# Patient Record
Sex: MALE | Race: WHITE | NOT HISPANIC OR LATINO | Employment: UNEMPLOYED | URBAN - METROPOLITAN AREA
[De-identification: names, ages, dates, MRNs, and addresses within clinical notes are randomized per-mention and may not be internally consistent; named-entity substitution may affect disease eponyms.]

---

## 2017-02-04 ENCOUNTER — HOSPITAL ENCOUNTER (EMERGENCY)
Facility: HOSPITAL | Age: 57
Discharge: HOME | End: 2017-02-04
Admitting: EMERGENCY MEDICINE
Payer: COMMERCIAL

## 2017-02-04 VITALS
TEMPERATURE: 98.6 F | RESPIRATION RATE: 20 BRPM | OXYGEN SATURATION: 96 % | DIASTOLIC BLOOD PRESSURE: 77 MMHG | SYSTOLIC BLOOD PRESSURE: 145 MMHG | WEIGHT: 180 LBS | HEART RATE: 81 BPM

## 2017-02-04 DIAGNOSIS — S05.00XA CORNEAL ABRASION: Primary | ICD-10-CM

## 2017-02-04 PROCEDURE — 99282 EMERGENCY DEPT VISIT SF MDM: CPT

## 2017-02-04 RX ORDER — ERYTHROMYCIN 5 MG/G
0.5 OINTMENT OPHTHALMIC EVERY 12 HOURS
Qty: 3.5 G | Refills: 0 | Status: SHIPPED | OUTPATIENT
Start: 2017-02-04 | End: 2017-02-14

## 2017-02-04 RX ORDER — PROPARACAINE HYDROCHLORIDE 5 MG/ML
1 SOLUTION/ DROPS OPHTHALMIC ONCE
Status: COMPLETED | OUTPATIENT
Start: 2017-02-04 | End: 2017-02-04

## 2017-02-04 RX ADMIN — PROPARACAINE HYDROCHLORIDE 1 DROP: 5 SOLUTION/ DROPS OPHTHALMIC at 19:36

## 2017-02-04 RX ADMIN — FLUORESCEIN SODIUM 1 STRIP: 1 STRIP OPHTHALMIC at 19:36

## 2018-07-11 ENCOUNTER — HOSPITAL ENCOUNTER (EMERGENCY)
Facility: HOSPITAL | Age: 58
Discharge: HOME | End: 2018-07-11
Attending: EMERGENCY MEDICINE | Admitting: EMERGENCY MEDICINE
Payer: COMMERCIAL

## 2018-07-11 VITALS
DIASTOLIC BLOOD PRESSURE: 66 MMHG | OXYGEN SATURATION: 97 % | WEIGHT: 180 LBS | RESPIRATION RATE: 18 BRPM | TEMPERATURE: 98.3 F | HEART RATE: 61 BPM | SYSTOLIC BLOOD PRESSURE: 164 MMHG

## 2018-07-11 DIAGNOSIS — H10.9 CONJUNCTIVITIS: Primary | ICD-10-CM

## 2018-07-11 DIAGNOSIS — H01.003 BLEPHARITIS OF EYELID OF RIGHT EYE: ICD-10-CM

## 2018-07-11 PROCEDURE — 99282 EMERGENCY DEPT VISIT SF MDM: CPT

## 2018-07-11 RX ORDER — CEPHALEXIN 500 MG/1
500 CAPSULE ORAL EVERY 6 HOURS SCHEDULED
Qty: 28 CAPSULE | Refills: 0 | Status: SHIPPED | OUTPATIENT
Start: 2018-07-11 | End: 2018-07-18

## 2018-07-11 RX ADMIN — FLUORESCEIN SODIUM AND PROPARACAINE HYDROCHLORIDE 1 DROP: 2.5; 5 SOLUTION/ DROPS OPHTHALMIC at 17:41

## 2018-07-11 NOTE — DISCHARGE INSTRUCTIONS
Blepharitis   WHAT YOU NEED TO KNOW:   Blepharitis is inflammation of one or both eyelids  Your eyelid, eyelashes, oil glands, or whites of the eye may be affected  DISCHARGE INSTRUCTIONS:   Medicines:   · Medicines  can help decrease pain and swelling, or treat an infection  · Take your medicine as directed  Contact your healthcare provider if you think your medicine is not helping or if you have side effects  Tell him or her if you are allergic to any medicine  Keep a list of the medicines, vitamins, and herbs you take  Include the amounts, and when and why you take them  Bring the list or the pill bottles to follow-up visits  Carry your medicine list with you in case of an emergency  Follow up with your healthcare provider as directed:  Write down your questions so you remember to ask them during your visits  Care for your eyelid:  Always wash your hands with soap and water before and after eye care:  · Use artificial tears  twice a day if you have dry eye  · Apply a warm compress  for 10 minutes once a day to loosen crusts and to decrease itching and burning  · Gently scrub your upper and lower eyelid  with 2 to 3 drops of baby shampoo in ½ cup warm water 2 times a day  This will help open your clogged oil glands and remove pus or other material stuck to your eyelid  · Massage your upper and lower eyelid in small circles for 5 seconds  to loosen oil plugs and to decrease inflammation  · Do not wear contact lenses or eye makeup  until your eye has healed  Contact your healthcare provider if:   · Your vision changes  · Your signs and symptoms get worse, even after treatment  · Your signs and symptoms return  · You have a lump on your eyelid  · You have a pus-filled sore on your eyelid  · You have questions or concerns about your condition or care  Return to the emergency department if:   · You have severe pain  · You have vision loss    © 2017 [facility] Information is for End User's use only and may not be sold, redistributed or otherwise used for commercial purposes  All illustrations and images included in CareNotes® are the copyrighted property of [employer]  or David M[employer]  The above information is an  only  It is not intended as medical advice for individual conditions or treatments  Talk to your doctor, nurse or pharmacist before following any medical regimen to see if it is safe and effective for you

## 2018-07-11 NOTE — ED PROVIDER NOTES
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 Taking?   loperamide (IMODIUM) 2 mg capsule   No No   Sig: Take 1 capsule (2 mg total) by mouth 4 (four) times a day as needed for diarrhea  Facility-Administered Medications: None       Past Medical History:   Diagnosis Date    No known health problems        Past Surgical History:   Procedure Laterality Date    PLEURAL SCARIFICATION         History reviewed  No pertinent family history  I have reviewed and agree with the history as documented  Social History   Substance Use Topics    Smoking status: Former Smoker    Smokeless tobacco: Not on file      Comment: 25 years ago    Alcohol use Yes      Comment: social        Review of Systems   Eyes: Positive for pain, discharge, redness and itching  All other systems reviewed and are negative  Physical Exam  Physical Exam   Constitutional: He appears well-developed and well-nourished  No distress  HENT:   Head: Normocephalic and atraumatic  Eyes: EOM are normal  Pupils are equal, round, and reactive to light  R conjunctival injection with drainage noted  Slight upper lid with localized redness and edema  No pain with EOMI  Fluorescein stain without abrasions or foreign bodies  Cardiovascular: Normal rate  Pulmonary/Chest: No respiratory distress  Neurological: He is alert  Skin: Skin is warm and dry  No rash noted  He is not diaphoretic  Psychiatric: He has a normal mood and affect  Vital Signs  ED Triage Vitals [07/11/18 1657]   Temperature Pulse Respirations Blood Pressure SpO2   98 3 °F (36 8 °C) 61 18 164/66 97 %      Temp Source Heart Rate Source Patient Position - Orthostatic VS BP Location FiO2 (%)   Oral Monitor Sitting Left arm --      Pain Score       4           Vitals:    07/11/18 1657   BP: 164/66   Pulse: 61   Patient Position - Orthostatic VS: Sitting       Visual Acuity      ED Medications  Medications   Fluorescein-Proparacaine (FLUCAINE) 0 25-0 5 % ophthalmic solution 1 drop (1 drop Right Eye Given by Other 7/11/18 9561)       Diagnostic Studies  Results Reviewed     None                 No orders to display              Procedures  Procedures       Phone Contacts  ED Phone Contact    ED Course                               MDM  Number of Diagnoses or Management Options  Blepharitis of eyelid of right eye:   Conjunctivitis:   Diagnosis management comments: Blepharitis which has not improved with erythromycin ointment  Will treat with keflex  No signs or symptoms or periorbital cellulitis  Advised pt to follow-up with ophthalmology tomorrow  Return precautions discussed with pt and spouse  CritCare Time    Disposition  Final diagnoses:   Conjunctivitis   Blepharitis of eyelid of right eye     Time reflects when diagnosis was documented in both MDM as applicable and the Disposition within this note     Time User Action Codes Description Comment    7/11/2018  5:58 PM Anay Kilgore Add [H10 9] Conjunctivitis     7/11/2018  5:58 PM Anay Kilgore Add [H01 003] Blepharitis of eyelid of right eye       ED Disposition     ED Disposition Condition Comment    Discharge  Rojas Check discharge to home/self care      Condition at discharge: Good Follow-up Information     Follow up With Specialties Details [facility] [address] [facility]  Schedule an appointment as soon as possible for a visit  [address]  [phone]          Discharge Medication List as of 7/11/2018  5:59 PM      START taking these medications    Details   cephalexin (KEFLEX) 500 mg capsule Take 1 capsule (500 mg total) by mouth every 6 (six) hours for 7 days, Starting Wed 7/11/2018, Until Wed 7/18/2018, Print         CONTINUE these medications which have NOT CHANGED    Details   loperamide (IMODIUM) 2 mg capsule Take 1 capsule (2 mg total) by mouth 4 (four) times a day as needed for diarrhea , Starting 7/9/2016, Until Discontinued, Print           No discharge procedures on file      ED Provider  Electronically Signed by           Mark Mistry PA-C  07/11/18 5278

## 2020-06-15 ENCOUNTER — APPOINTMENT (EMERGENCY)
Dept: CT IMAGING | Facility: HOSPITAL | Age: 60
End: 2020-06-15
Payer: COMMERCIAL

## 2020-06-15 ENCOUNTER — HOSPITAL ENCOUNTER (EMERGENCY)
Facility: HOSPITAL | Age: 60
Discharge: HOME | End: 2020-06-15
Attending: EMERGENCY MEDICINE | Admitting: EMERGENCY MEDICINE
Payer: COMMERCIAL

## 2020-06-15 VITALS
HEART RATE: 103 BPM | SYSTOLIC BLOOD PRESSURE: 108 MMHG | RESPIRATION RATE: 20 BRPM | TEMPERATURE: 97.3 F | OXYGEN SATURATION: 96 % | DIASTOLIC BLOOD PRESSURE: 75 MMHG

## 2020-06-15 DIAGNOSIS — S71.131A: Primary | ICD-10-CM

## 2020-06-15 LAB
ANION GAP SERPL CALCULATED.3IONS-SCNC: 11 MMOL/L (ref 4–13)
APTT PPP: 34 SECONDS (ref 23–37)
BASOPHILS # BLD AUTO: 0.04 THOUSANDS/ΜL (ref 0–0.1)
BASOPHILS NFR BLD AUTO: 1 % (ref 0–1)
BUN SERPL-MCNC: 10 MG/DL (ref 5–25)
CALCIUM SERPL-MCNC: 8.3 MG/DL (ref 8.3–10.1)
CHLORIDE SERPL-SCNC: 101 MMOL/L (ref 100–108)
CO2 SERPL-SCNC: 25 MMOL/L (ref 21–32)
CREAT SERPL-MCNC: 0.9 MG/DL (ref 0.6–1.3)
EOSINOPHIL # BLD AUTO: 0.1 THOUSAND/ΜL (ref 0–0.61)
EOSINOPHIL NFR BLD AUTO: 1 % (ref 0–6)
ERYTHROCYTE [DISTWIDTH] IN BLOOD BY AUTOMATED COUNT: 12.7 % (ref 11.6–15.1)
GFR SERPL CREATININE-BSD FRML MDRD: 94 ML/MIN/1.73SQ M
GLUCOSE SERPL-MCNC: 122 MG/DL (ref 65–140)
HCT VFR BLD AUTO: 40 % (ref 36.5–49.3)
HGB BLD-MCNC: 12.9 G/DL (ref 12–17)
IMM GRANULOCYTES # BLD AUTO: 0.03 THOUSAND/UL (ref 0–0.2)
IMM GRANULOCYTES NFR BLD AUTO: 0 % (ref 0–2)
INR PPP: 1.04 (ref 0.84–1.19)
LYMPHOCYTES # BLD AUTO: 1.66 THOUSANDS/ΜL (ref 0.6–4.47)
LYMPHOCYTES NFR BLD AUTO: 24 % (ref 14–44)
MCH RBC QN AUTO: 33.1 PG (ref 26.8–34.3)
MCHC RBC AUTO-ENTMCNC: 32.3 G/DL (ref 31.4–37.4)
MCV RBC AUTO: 103 FL (ref 82–98)
MONOCYTES # BLD AUTO: 0.97 THOUSAND/ΜL (ref 0.17–1.22)
MONOCYTES NFR BLD AUTO: 14 % (ref 4–12)
NEUTROPHILS # BLD AUTO: 4.11 THOUSANDS/ΜL (ref 1.85–7.62)
NEUTS SEG NFR BLD AUTO: 60 % (ref 43–75)
NRBC BLD AUTO-RTO: 0 /100 WBCS
PLATELET # BLD AUTO: 217 THOUSANDS/UL (ref 149–390)
PMV BLD AUTO: 9.9 FL (ref 8.9–12.7)
POTASSIUM SERPL-SCNC: 3.9 MMOL/L (ref 3.5–5.3)
PROTHROMBIN TIME: 13.7 SECONDS (ref 11.6–14.5)
RBC # BLD AUTO: 3.9 MILLION/UL (ref 3.88–5.62)
SODIUM SERPL-SCNC: 137 MMOL/L (ref 136–145)
WBC # BLD AUTO: 6.91 THOUSAND/UL (ref 4.31–10.16)

## 2020-06-15 PROCEDURE — 90715 TDAP VACCINE 7 YRS/> IM: CPT | Performed by: EMERGENCY MEDICINE

## 2020-06-15 PROCEDURE — 99284 EMERGENCY DEPT VISIT MOD MDM: CPT

## 2020-06-15 PROCEDURE — 73701 CT LOWER EXTREMITY W/DYE: CPT

## 2020-06-15 PROCEDURE — 85025 COMPLETE CBC W/AUTO DIFF WBC: CPT | Performed by: EMERGENCY MEDICINE

## 2020-06-15 PROCEDURE — 99282 EMERGENCY DEPT VISIT SF MDM: CPT | Performed by: EMERGENCY MEDICINE

## 2020-06-15 PROCEDURE — 90471 IMMUNIZATION ADMIN: CPT

## 2020-06-15 PROCEDURE — 36415 COLL VENOUS BLD VENIPUNCTURE: CPT | Performed by: EMERGENCY MEDICINE

## 2020-06-15 PROCEDURE — 85610 PROTHROMBIN TIME: CPT | Performed by: EMERGENCY MEDICINE

## 2020-06-15 PROCEDURE — 80048 BASIC METABOLIC PNL TOTAL CA: CPT | Performed by: EMERGENCY MEDICINE

## 2020-06-15 PROCEDURE — 85730 THROMBOPLASTIN TIME PARTIAL: CPT | Performed by: EMERGENCY MEDICINE

## 2020-06-15 RX ADMIN — IOHEXOL 100 ML: 350 INJECTION, SOLUTION INTRAVENOUS at 21:38

## 2020-06-15 RX ADMIN — TETANUS TOXOID, REDUCED DIPHTHERIA TOXOID AND ACELLULAR PERTUSSIS VACCINE, ADSORBED 0.5 ML: 5; 2.5; 8; 8; 2.5 SUSPENSION INTRAMUSCULAR at 20:59

## 2022-10-05 ENCOUNTER — VBI (OUTPATIENT)
Dept: ADMINISTRATIVE | Facility: OTHER | Age: 62
End: 2022-10-05

## 2023-01-10 ENCOUNTER — VBI (OUTPATIENT)
Dept: ADMINISTRATIVE | Facility: OTHER | Age: 63
End: 2023-01-10

## 2023-10-26 ENCOUNTER — VBI (OUTPATIENT)
Dept: ADMINISTRATIVE | Facility: OTHER | Age: 63
End: 2023-10-26

## 2023-10-27 ENCOUNTER — HOSPITAL ENCOUNTER (EMERGENCY)
Facility: HOSPITAL | Age: 63
End: 2023-10-31
Attending: EMERGENCY MEDICINE
Payer: COMMERCIAL

## 2023-10-27 DIAGNOSIS — Z00.8 ENCOUNTER FOR PSYCHOLOGICAL EVALUATION: Primary | ICD-10-CM

## 2023-10-27 DIAGNOSIS — Z00.8 MEDICAL CLEARANCE FOR PSYCHIATRIC ADMISSION: ICD-10-CM

## 2023-10-27 DIAGNOSIS — K92.1 BLOODY STOOL: ICD-10-CM

## 2023-10-27 PROCEDURE — 99285 EMERGENCY DEPT VISIT HI MDM: CPT

## 2023-10-27 NOTE — LETTER
[address] [address]  Dept: [phone]      EMTALA TRANSFER CONSENT    NAME Arlene Vaughan                                         1960                              MRN [id number]    I have been informed of my rights regarding examination, treatment, and transfer   by Dr. Juan Allen MD    Benefits: Specialized equipment and/or services available at the receiving facility (Include comment)________________________    Risks: Potential for delay in receiving treatment      Transfer Request   I acknowledge that my medical condition has been evaluated and explained to me by the emergency department physician or other qualified medical person and/or my attending physician who has recommended and offered to me further medical examination and treatment. I understand the Hospital's obligation with respect to the treatment and stabilization of my emergency medical condition. I nevertheless request to be transferred. I release the Hospital, the doctor, and any other persons caring for me from all responsibility or liability for any injury or ill effects that may result from my transfer and agree to accept all responsibility for the consequences of my choice to transfer, rather than receive stabilizing treatment at the Hospital. I understand that because the transfer is my request, my insurance may not provide reimbursement for the services. The Hospital will assist and direct me and my family in how to make arrangements for transfer, but the hospital is not liable for any fees charged by the transport service. In spite of this understanding, I refuse to consent to further medical examination and treatment which has been offered to me, and request transfer to [address] [place] [address] [address] Name, [facility] : [id number].  I authorize the performance of emergency medical procedures and treatments upon me in both transit and upon arrival at the receiving facility. Additionally, I authorize the release of any and all medical records to the receiving facility and request they be transported with me, if possible. I authorize the performance of emergency medical procedures and treatments upon me in both transit and upon arrival at the receiving facility. Additionally, I authorize the release of any and all medical records to the receiving facility and request they be transported with me, if possible. I understand that the safest mode of transportation during a medical emergency is an ambulance and that the Hospital advocates the use of this mode of transport. Risks of traveling to the receiving facility by car, including absence of medical control, life sustaining equipment, such as oxygen, and medical personnel has been explained to me and I fully understand them. (KARI CORRECT BOX BELOW)  [  ]  I consent to the stated transfer and to be transported by ambulance/helicopter. [  ]  I consent to the stated transfer, but refuse transportation by ambulance and accept full responsibility for my transportation by car. I understand the risks of non-ambulance transfers and I exonerate the Hospital and its staff from any deterioration in my condition that results from this refusal.    X___________________________________________    DATE  10/31/23  TIME________  Signature of patient or legally responsible individual signing on patient behalf           RELATIONSHIP TO PATIENT_________________________          Provider Certification    NAME Cora Herman                                         1960                              MRN [id number]    A medical screening exam was performed on the above named patient. Based on the examination:    Condition Necessitating Transfer The primary encounter diagnosis was Encounter for psychological evaluation.  Diagnoses of Medical clearance for psychiatric admission and Bloody stool were also pertinent to this visit. Patient Condition: The patient has been stabilized such that within reasonable medical probability, no material deterioration of the patient condition or the condition of the unborn child(garo) is likely to result from the transfer    Reason for Transfer: Level of Care needed not available at this facility    Transfer Requirements: [address] available and qualified personnel available for treatment as acknowledged by Reji Fish MA  Agreed to accept transfer and to provide appropriate medical treatment as acknowledged by       Dr. Kevin Ahuja  Appropriate medical records of the examination and treatment of the patient are provided at the time of transfer   [address] _______  Transfer will be performed by qualified personnel from [facility] WITH [facility]  and appropriate transfer equipment as required, including the use of necessary and appropriate life support measures. Provider Certification: I have examined the patient and explained the following risks and benefits of being transferred/refusing transfer to the patient/family:  General risk, such as traffic hazards, adverse weather conditions, rough terrain or turbulence, possible failure of equipment (including vehicle or aircraft), or consequences of actions of persons outside the control of the transport personnel      Based on these reasonable risks and benefits to the patient and/or the unborn child(garo), and based upon the information available at the time of the patient’s examination, I certify that the medical benefits reasonably to be expected from the provision of appropriate medical treatments at another medical facility outweigh the increasing risks, if any, to the individual’s medical condition, and in the case of labor to the unborn child, from effecting the transfer.     X____________________________________________ DATE 10/31/23        TIME_______      ORIGINAL - SEND TO MEDICAL RECORDS   COPY - SEND WITH PATIENT DURING TRANSFER

## 2023-10-28 LAB
ALBUMIN SERPL BCP-MCNC: 4.5 G/DL (ref 3.5–5)
ALP SERPL-CCNC: 106 U/L (ref 34–104)
ALT SERPL W P-5'-P-CCNC: 64 U/L (ref 7–52)
AMPHETAMINES SERPL QL SCN: NEGATIVE
ANION GAP SERPL CALCULATED.3IONS-SCNC: 11 MMOL/L
APAP SERPL-MCNC: <10 UG/ML (ref 10–20)
AST SERPL W P-5'-P-CCNC: 93 U/L (ref 13–39)
BARBITURATES UR QL: NEGATIVE
BASOPHILS # BLD AUTO: 0.04 THOUSANDS/ÂΜL (ref 0–0.1)
BASOPHILS NFR BLD AUTO: 1 % (ref 0–1)
BENZODIAZ UR QL: NEGATIVE
BILIRUB SERPL-MCNC: 0.48 MG/DL (ref 0.2–1)
BILIRUB UR QL STRIP: NEGATIVE
BUN SERPL-MCNC: 7 MG/DL (ref 5–25)
CALCIUM SERPL-MCNC: 9.1 MG/DL (ref 8.4–10.2)
CHLORIDE SERPL-SCNC: 105 MMOL/L (ref 96–108)
CLARITY UR: CLEAR
CO2 SERPL-SCNC: 23 MMOL/L (ref 21–32)
COCAINE UR QL: NEGATIVE
COLOR UR: YELLOW
CREAT SERPL-MCNC: 0.86 MG/DL (ref 0.6–1.3)
EOSINOPHIL # BLD AUTO: 0.07 THOUSAND/ÂΜL (ref 0–0.61)
EOSINOPHIL NFR BLD AUTO: 1 % (ref 0–6)
ERYTHROCYTE [DISTWIDTH] IN BLOOD BY AUTOMATED COUNT: 12.7 % (ref 11.6–15.1)
ETHANOL EXG-MCNC: 0.05 MG/DL
ETHANOL EXG-MCNC: 0.16 MG/DL
ETHANOL SERPL-MCNC: 260 MG/DL
GFR SERPL CREATININE-BSD FRML MDRD: 93 ML/MIN/1.73SQ M
GLUCOSE SERPL-MCNC: 111 MG/DL (ref 65–140)
GLUCOSE SERPL-MCNC: 112 MG/DL (ref 65–140)
GLUCOSE UR STRIP-MCNC: NEGATIVE MG/DL
HCT VFR BLD AUTO: 41.5 % (ref 36.5–49.3)
HGB BLD-MCNC: 13.8 G/DL (ref 12–17)
HGB UR QL STRIP.AUTO: NEGATIVE
IMM GRANULOCYTES # BLD AUTO: 0.03 THOUSAND/UL (ref 0–0.2)
IMM GRANULOCYTES NFR BLD AUTO: 1 % (ref 0–2)
KETONES UR STRIP-MCNC: NEGATIVE MG/DL
LEUKOCYTE ESTERASE UR QL STRIP: NEGATIVE
LYMPHOCYTES # BLD AUTO: 2.26 THOUSANDS/ÂΜL (ref 0.6–4.47)
LYMPHOCYTES NFR BLD AUTO: 34 % (ref 14–44)
MCH RBC QN AUTO: 34.2 PG (ref 26.8–34.3)
MCHC RBC AUTO-ENTMCNC: 33.3 G/DL (ref 31.4–37.4)
MCV RBC AUTO: 103 FL (ref 82–98)
MONOCYTES # BLD AUTO: 0.81 THOUSAND/ÂΜL (ref 0.17–1.22)
MONOCYTES NFR BLD AUTO: 12 % (ref 4–12)
NEUTROPHILS # BLD AUTO: 3.4 THOUSANDS/ÂΜL (ref 1.85–7.62)
NEUTS SEG NFR BLD AUTO: 51 % (ref 43–75)
NITRITE UR QL STRIP: NEGATIVE
NRBC BLD AUTO-RTO: 0 /100 WBCS
OPIATES UR QL SCN: NEGATIVE
OXYCODONE+OXYMORPHONE UR QL SCN: NEGATIVE
PCP UR QL: NEGATIVE
PH UR STRIP.AUTO: 5.5 [PH]
PLATELET # BLD AUTO: 199 THOUSANDS/UL (ref 149–390)
PMV BLD AUTO: 9.6 FL (ref 8.9–12.7)
POTASSIUM SERPL-SCNC: 4.2 MMOL/L (ref 3.5–5.3)
PROT SERPL-MCNC: 7.8 G/DL (ref 6.4–8.4)
PROT UR STRIP-MCNC: NEGATIVE MG/DL
RBC # BLD AUTO: 4.04 MILLION/UL (ref 3.88–5.62)
SALICYLATES SERPL-MCNC: <5 MG/DL (ref 3–20)
SODIUM SERPL-SCNC: 139 MMOL/L (ref 135–147)
SP GR UR STRIP.AUTO: 1.01
THC UR QL: NEGATIVE
TSH SERPL DL<=0.05 MIU/L-ACNC: 2.5 UIU/ML (ref 0.45–4.5)
UROBILINOGEN UR QL STRIP.AUTO: 0.2 E.U./DL
WBC # BLD AUTO: 6.61 THOUSAND/UL (ref 4.31–10.16)

## 2023-10-28 PROCEDURE — 82948 REAGENT STRIP/BLOOD GLUCOSE: CPT

## 2023-10-28 PROCEDURE — 80179 DRUG ASSAY SALICYLATE: CPT | Performed by: EMERGENCY MEDICINE

## 2023-10-28 PROCEDURE — 84443 ASSAY THYROID STIM HORMONE: CPT | Performed by: EMERGENCY MEDICINE

## 2023-10-28 PROCEDURE — 80053 COMPREHEN METABOLIC PANEL: CPT | Performed by: EMERGENCY MEDICINE

## 2023-10-28 PROCEDURE — 81003 URINALYSIS AUTO W/O SCOPE: CPT | Performed by: EMERGENCY MEDICINE

## 2023-10-28 PROCEDURE — 96360 HYDRATION IV INFUSION INIT: CPT

## 2023-10-28 PROCEDURE — 96361 HYDRATE IV INFUSION ADD-ON: CPT

## 2023-10-28 PROCEDURE — 80307 DRUG TEST PRSMV CHEM ANLYZR: CPT | Performed by: EMERGENCY MEDICINE

## 2023-10-28 PROCEDURE — 80143 DRUG ASSAY ACETAMINOPHEN: CPT | Performed by: EMERGENCY MEDICINE

## 2023-10-28 PROCEDURE — 99285 EMERGENCY DEPT VISIT HI MDM: CPT | Performed by: EMERGENCY MEDICINE

## 2023-10-28 PROCEDURE — 36415 COLL VENOUS BLD VENIPUNCTURE: CPT | Performed by: EMERGENCY MEDICINE

## 2023-10-28 PROCEDURE — 85025 COMPLETE CBC W/AUTO DIFF WBC: CPT | Performed by: EMERGENCY MEDICINE

## 2023-10-28 PROCEDURE — 82077 ASSAY SPEC XCP UR&BREATH IA: CPT | Performed by: EMERGENCY MEDICINE

## 2023-10-28 PROCEDURE — 82075 ASSAY OF BREATH ETHANOL: CPT | Performed by: EMERGENCY MEDICINE

## 2023-10-28 RX ORDER — LANOLIN ALCOHOL/MO/W.PET/CERES
100 CREAM (GRAM) TOPICAL DAILY
Status: DISCONTINUED | OUTPATIENT
Start: 2023-10-28 | End: 2023-10-31 | Stop reason: HOSPADM

## 2023-10-28 RX ORDER — OLANZAPINE 10 MG/2ML
10 INJECTION, POWDER, FOR SOLUTION INTRAMUSCULAR ONCE
Status: DISCONTINUED | OUTPATIENT
Start: 2023-10-28 | End: 2023-10-28

## 2023-10-28 RX ORDER — LORAZEPAM 1 MG/1
2 TABLET ORAL ONCE
Status: COMPLETED | OUTPATIENT
Start: 2023-10-28 | End: 2023-10-28

## 2023-10-28 RX ORDER — LORAZEPAM 1 MG/1
1 TABLET ORAL ONCE
Status: COMPLETED | OUTPATIENT
Start: 2023-10-28 | End: 2023-10-28

## 2023-10-28 RX ORDER — LORAZEPAM 2 MG/ML
1 INJECTION INTRAMUSCULAR ONCE
Status: DISCONTINUED | OUTPATIENT
Start: 2023-10-28 | End: 2023-10-31

## 2023-10-28 RX ORDER — FOLIC ACID 1 MG/1
1 TABLET ORAL DAILY
Status: DISCONTINUED | OUTPATIENT
Start: 2023-10-28 | End: 2023-10-31 | Stop reason: HOSPADM

## 2023-10-28 RX ORDER — PANTOPRAZOLE SODIUM 40 MG/1
40 TABLET, DELAYED RELEASE ORAL
Status: DISCONTINUED | OUTPATIENT
Start: 2023-10-28 | End: 2023-10-31 | Stop reason: HOSPADM

## 2023-10-28 RX ORDER — OLANZAPINE 10 MG/1
10 TABLET ORAL ONCE
Status: COMPLETED | OUTPATIENT
Start: 2023-10-28 | End: 2023-10-28

## 2023-10-28 RX ADMIN — OLANZAPINE 10 MG: 10 TABLET, FILM COATED ORAL at 16:42

## 2023-10-28 RX ADMIN — SODIUM CHLORIDE 1000 ML: 0.9 INJECTION, SOLUTION INTRAVENOUS at 17:26

## 2023-10-28 RX ADMIN — SODIUM CHLORIDE 1000 ML: 0.9 INJECTION, SOLUTION INTRAVENOUS at 00:37

## 2023-10-28 RX ADMIN — Medication 1 TABLET: at 08:39

## 2023-10-28 RX ADMIN — THIAMINE HCL TAB 100 MG 100 MG: 100 TAB at 08:38

## 2023-10-28 RX ADMIN — LORAZEPAM 1 MG: 1 TABLET ORAL at 05:23

## 2023-10-28 RX ADMIN — SODIUM CHLORIDE 1000 ML: 0.9 INJECTION, SOLUTION INTRAVENOUS at 22:12

## 2023-10-28 RX ADMIN — LORAZEPAM 2 MG: 1 TABLET ORAL at 10:25

## 2023-10-28 RX ADMIN — FOLIC ACID 1 MG: 1 TABLET ORAL at 08:38

## 2023-10-28 RX ADMIN — LORAZEPAM 1 MG: 1 TABLET ORAL at 17:26

## 2023-10-28 NOTE — ED NOTES
See previous [facility] Suicide Risk Assessment. Re-screening not required unless change in behavior or suicidal ideation. Behavioral Health Assessment deferred as patient is sleeping and would benefit from additional rest.    Once patient is awake and able to participate, will complete assessments.      Alice Power RN  10/28/23 0075

## 2023-10-28 NOTE — ED PROVIDER NOTES
History  Chief Complaint   Patient presents with    Psychiatric Evaluation     302, pt also states he had bloody stool for a few months, daily alcohol 2L (wine) daily, anxiety attacks,stress. 63-year-old male presenting to the ED on a 302 warrant states that he has been having bloody stool for a few months and that he has been dealing with severe depression and anxiety for which she treats with alcohol. States that he drinks approximately 2 L of wine daily, does not drink hard liquor or beer. Denies hallucinations, other drug use. At this time patient is denying suicidal or homicidal ideation. Denies any physical pain however does state he has been having bloody stools for months. Patient denies any prior alcohol withdrawal or known seizures however does state that he has a family history of seizures but he has never had 1. Prior to Admission Medications   Prescriptions Last Dose Informant Patient Reported? Taking?   loperamide (IMODIUM) 2 mg capsule   No No   Sig: Take 1 capsule (2 mg total) by mouth 4 (four) times a day as needed for diarrhea. Facility-Administered Medications: None       Past Medical History:   Diagnosis Date    Anxiety     ETOH abuse     No known health problems        Past Surgical History:   Procedure Laterality Date    LUNG LOBECTOMY      PLEURAL SCARIFICATION         No family history on file. I have reviewed and agree with the history as documented. E-Cigarette/Vaping    E-Cigarette Use Never User      E-Cigarette/Vaping Substances    Nicotine No     Flavoring No      Social History     Tobacco Use    Smoking status: Former    Smokeless tobacco: Never    Tobacco comments:     25 years ago   Vaping Use    Vaping Use: Never used   Substance Use Topics    Alcohol use: Yes     Alcohol/week: 70.0 standard drinks of alcohol     Types: 70 Glasses of wine per week     Comment: social    Drug use: No       Review of Systems   Gastrointestinal:  Positive for blood in stool. Psychiatric/Behavioral:  Positive for suicidal ideas. All other systems reviewed and are negative. Physical Exam  Physical Exam  Vitals and nursing note reviewed. Exam conducted with a chaperone present Deannie Sandhoff, RN). Constitutional:       General: He is not in acute distress. Appearance: He is well-developed. He is not diaphoretic. HENT:      Head: Normocephalic and atraumatic. Right Ear: External ear normal.      Left Ear: External ear normal.      Nose: Nose normal.   Eyes:      General: No scleral icterus. Right eye: No discharge. Left eye: No discharge. Conjunctiva/sclera: Conjunctivae normal.   Cardiovascular:      Rate and Rhythm: Normal rate and regular rhythm. Heart sounds: Normal heart sounds. No murmur heard. No friction rub. No gallop. Pulmonary:      Effort: Pulmonary effort is normal. No respiratory distress. Breath sounds: Normal breath sounds. No wheezing or rales. Abdominal:      General: Bowel sounds are normal. There is no distension. Palpations: Abdomen is soft. There is no mass. Tenderness: There is no abdominal tenderness. There is no guarding. Genitourinary:     Rectum: Guaiac result negative. External hemorrhoid present. No mass, tenderness, anal fissure or internal hemorrhoid. Normal anal tone. Musculoskeletal:         General: No tenderness or deformity. Normal range of motion. Cervical back: Normal range of motion and neck supple. Skin:     General: Skin is warm and dry. Coloration: Skin is not pale. Findings: No erythema or rash. Neurological:      General: No focal deficit present. Mental Status: He is alert and oriented to person, place, and time. Psychiatric:         Behavior: Behavior normal.         Thought Content:  Thought content normal.         Judgment: Judgment normal.               Vital Signs  ED Triage Vitals [10/28/23 0000]   Temp Pulse Respirations Blood Pressure SpO2 -- 86 16 159/98 95 %      Temp src Heart Rate Source Patient Position - Orthostatic VS BP Location FiO2 (%)   -- Monitor Lying Left arm --      Pain Score       No Pain           Vitals:    10/28/23 0000 10/28/23 0400 10/28/23 0530   BP: 159/98 108/55 145/91   Pulse: 86 80 83   Patient Position - Orthostatic VS: Lying           Visual Acuity      ED Medications  Medications   thiamine tablet 100 mg (has no administration in time range)   folic acid (FOLVITE) tablet 1 mg (has no administration in time range)   multivitamin-minerals (CENTRUM) tablet 1 tablet (has no administration in time range)   pantoprazole (PROTONIX) EC tablet 40 mg (40 mg Oral Not Given 10/28/23 0525)   sodium chloride 0.9 % bolus 1,000 mL (0 mL Intravenous Stopped 10/28/23 0419)   LORazepam (ATIVAN) tablet 1 mg (1 mg Oral Given 10/28/23 0523)       Diagnostic Studies  Results Reviewed       Procedure Component Value Units Date/Time    Fingerstick Glucose (POCT) [[id number]]  (Normal) Collected: 10/28/23 0610    Lab Status: Final result Updated: 10/28/23 0611     POC Glucose 111 mg/dl     TSH [[id number]]  (Normal) Collected: 10/28/23 0033    Lab Status: Final result Specimen: Blood from Arm, Right Updated: 10/28/23 0125     TSH 3RD GENERATON 2.505 uIU/mL     Ethanol [[id number]]  (Abnormal) Collected: 10/28/23 0033    Lab Status: Final result Specimen: Blood from Arm, Right Updated: 10/28/23 0109     Ethanol Lvl 260 mg/dL     Comprehensive metabolic panel [[id number]]  (Abnormal) Collected: 10/28/23 0033    Lab Status: Final result Specimen: Blood from Arm, Right Updated: 10/28/23 0109     Sodium 139 mmol/L      Potassium 4.2 mmol/L      Chloride 105 mmol/L      CO2 23 mmol/L      ANION GAP 11 mmol/L      BUN 7 mg/dL      Creatinine 0.86 mg/dL      Glucose 112 mg/dL      Calcium 9.1 mg/dL      AST 93 U/L      ALT 64 U/L      Alkaline Phosphatase 106 U/L      Total Protein 7.8 g/dL      Albumin 4.5 g/dL      Total Bilirubin 0.48 mg/dL      eGFR 93 ml/min/1.73sq m     Narrative:      [facility] guidelines for Chronic Kidney Disease (CKD):     Stage 1 with normal or high GFR (GFR > 90 mL/min/1.73 square meters)    Stage 2 Mild CKD (GFR = 60-89 mL/min/1.73 square meters)    Stage 3A Moderate CKD (GFR = 45-59 mL/min/1.73 square meters)    Stage 3B Moderate CKD (GFR = 30-44 mL/min/1.73 square meters)    Stage 4 Severe CKD (GFR = 15-29 mL/min/1.73 square meters)    Stage 5 End Stage CKD (GFR <15 mL/min/1.73 square meters)  Note: GFR calculation is accurate only with a steady state creatinine    Salicylate level [[id number]]  (Normal) Collected: 10/28/23 0033    Lab Status: Final result Specimen: Blood from Arm, Right Updated: 47/10/98 5772     Salicylate Lvl <5 mg/dL     Acetaminophen level-If concentration is detectable, please discuss with medical  on call. [[id number]]  (Abnormal) Collected: 10/28/23 0033    Lab Status: Final result Specimen: Blood from Arm, Right Updated: 10/28/23 0109     Acetaminophen Level <10 ug/mL     Rapid drug screen, urine [[id number]] Collected: 10/28/23 0041    Lab Status: Final result Specimen: Urine Updated: 10/28/23 0058     Amph/Meth UR Negative     Barbiturate Ur Negative     Benzodiazepine Urine Negative     Cocaine Urine Negative     Methadone Urine --     Opiate Urine Negative     PCP Ur Negative     THC Urine Negative     Oxycodone Urine Negative    Narrative:      FOR MEDICAL PURPOSES ONLY. IF CONFIRMATION NEEDED PLEASE CONTACT THE LAB WITHIN 5 DAYS.     Drug Screen Cutoff Levels:  AMPHETAMINE/METHAMPHETAMINES  1000 ng/mL  BARBITURATES     200 ng/mL  BENZODIAZEPINES     200 ng/mL  COCAINE      300 ng/mL  METHADONE      300 ng/mL  OPIATES      300 ng/mL  PHENCYCLIDINE     25 ng/mL  THC       50 ng/mL  OXYCODONE      100 ng/mL    UA w Reflex to Microscopic w Reflex to Culture [[id number]]  (Normal) Collected: 10/28/23 0035    Lab Status: Final result Specimen: Urine, Other Updated: 10/28/23 0044     Color, UA Yellow     Clarity, UA Clear     Specific Gravity, UA 1.010     pH, UA 5.5     Leukocytes, UA Negative     Nitrite, UA Negative     Protein, UA Negative mg/dl      Glucose, UA Negative mg/dl      Ketones, UA Negative mg/dl      Urobilinogen, UA 0.2 E.U./dl      Bilirubin, UA Negative     Occult Blood, UA Negative    CBC and differential [[id number]]  (Abnormal) Collected: 10/28/23 0033    Lab Status: Final result Specimen: Blood from Arm, Right Updated: 10/28/23 0042     WBC 6.61 Thousand/uL      RBC 4.04 Million/uL      Hemoglobin 13.8 g/dL      Hematocrit 41.5 %       fL      MCH 34.2 pg      MCHC 33.3 g/dL      RDW 12.7 %      MPV 9.6 fL      Platelets 369 Thousands/uL      nRBC 0 /100 WBCs      Neutrophils Relative 51 %      Immat GRANS % 1 %      Lymphocytes Relative 34 %      Monocytes Relative 12 %      Eosinophils Relative 1 %      Basophils Relative 1 %      Neutrophils Absolute 3.40 Thousands/µL      Immature Grans Absolute 0.03 Thousand/uL      Lymphocytes Absolute 2.26 Thousands/µL      Monocytes Absolute 0.81 Thousand/µL      Eosinophils Absolute 0.07 Thousand/µL      Basophils Absolute 0.04 Thousands/µL     POCT alcohol breath test [[id number]]  (Normal) Resulted: 10/28/23 0040    Lab Status: Final result Updated: 10/28/23 0040     EXTBreath Alcohol 0.162                   No orders to display              Procedures  ECG 12 Lead Documentation Only    Date/Time: 10/28/2023 12:36 AM    Performed by: Ellie Suresh DO  Authorized by: Ellie Suresh DO    Patient location:  ED  Rate:     ECG rate:  83    ECG rate assessment: normal    Rhythm:     Rhythm: sinus rhythm    Ectopy:     Ectopy: none    QRS:     QRS axis:  Normal    QRS intervals:  Normal  Conduction:     Conduction: normal    ST segments:     ST segments:  Normal  T waves:     T waves: normal             ED Course  ED Course as of 10/28/23 0629   Sat Oct 28, 2023   0048 Patient allowing us to speak with his wife who is present. Patient's wife adding corroborating evidence and providing text messages that were reportedly sent to his sister as his reported last will and testament   0116 MEDICAL ALCOHOL(!): 260   0117 Hemoglobin: 13.8                               SBIRT 20yo+      Flowsheet Row Most Recent Value   Initial Alcohol Screen: US AUDIT-C     1. How often do you have a drink containing alcohol? 6 Filed at: 10/28/2023 0009   2. How many drinks containing alcohol do you have on a typical day you are drinking? 6 Filed at: 10/28/2023 0009   3a. Male UNDER 65: How often do you have five or more drinks on one occasion? 6 Filed at: 10/28/2023 0009   3b. FEMALE Any Age, or MALE 65+: How often do you have 4 or more drinks on one occassion? 0 Filed at: 10/28/2023 0009   Audit-C Score 18 Filed at: 10/28/2023 0009   Full Alcohol Screen: US AUDIT    4. How often during the last year have you found that you were not able to stop drinking once you had started? 4 Filed at: 10/28/2023 0009   5. How often during past year have you failed to do what was normally expected of you because of drinking? 0 Filed at: 10/28/2023 0009   6. How often in past year have you needed a first drink in the morning to get yourself going after a heavy drinking session? 4 Filed at: 10/28/2023 0009   7. How often in past year have you had feeling of guilt or remorse after drinking? 4 Filed at: 10/28/2023 0009   8. How often in past year have you been unable to remember what happened night before because you had been drinking? 4 Filed at: 10/28/2023 0009   9. Have you or someone else been injured as a result of your drinking? 4 Filed at: 10/28/2023 0009   10. Has a relative, friend, doctor or other health worker been concerned about your drinking and suggested you cut down? 4 Filed at: 10/28/2023 0009   AUDIT Total Score 42 Filed at: 10/28/2023 0009   CAREN: How many times in the past year have you. ..     Used an illegal drug or used a prescription medication for non-medical reasons? Never Filed at: 10/28/2023 0009                      Medical Decision Making  62-year-old male with history of alcohol use with reported anxiety and depression with suicidal statements made to family who filed a 36. Patient does admit to worsening depression and anxiety however at this time while intoxicated denies suicidal and homicidal ideation he does admit to rectal bleeding for the last few months. Does state he wants help with stopping drinking. Blood work along with crisis evaluation when sober. Patient is on a 302 warrant so is unable to leave at this time as he is clearly intoxicated. Patient is calm and cooperative on initial evaluation and willing to stay as he does state he wants help. Signed out to Dr. Bart Rubio pending crisis evaluation    Amount and/or Complexity of Data Reviewed  Labs: ordered. Decision-making details documented in ED Course. Risk  OTC drugs. Prescription drug management. Decision regarding hospitalization. Disposition  Final diagnoses:   Encounter for psychological evaluation     Time reflects when diagnosis was documented in both MDM as applicable and the Disposition within this note       Time User Action Codes Description Comment    10/28/2023  1:44 AM Gerardo Hill Add [Z00.8] Encounter for psychological evaluation           ED Disposition       ED Disposition   Transfer to [address] [place] [address]   --    Date/Time   Sat Oct 28, 2023 0144    Comment   Maritza Piper should be transferred out to [facility] and has been medically cleared. Follow-up Information    None         Patient's Medications   Discharge Prescriptions    No medications on file       No discharge procedures on file.     PDMP Review       None            ED Provider  Electronically Signed by             Juliana Luna DO  10/28/23 4976
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
## 2023-10-28 NOTE — ED NOTES
Pt ambulated with aid around the ED, Pt is calm and eating Dinner now with wife at bedside. Denies pain or any need at this time.       Bo Harris  10/28/23 1538

## 2023-10-28 NOTE — ED NOTES
CIS faxed 201 and facesheet to intake. CIS sent TT to intake to inform of fax. Patient prefers [facility] at this time but with alcohol abuse not sure if that would be the best option.

## 2023-10-28 NOTE — ED NOTES
302 was petitioned by the patient's wife, Travon Kwok. 302 states:  "My  has been diagnosed with depression in the past after numerous suicidal threats. He is not currently involved in any [address] [place] [address] services. 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 During that visit he repeatedly made statements about driving into a tractor trailer as well as driving erratically at high rates of speed. 4-5 days ago he called me to tell me he was again feeling depressed and was going to kill himself. He then hung up the phone and would not answer my calls. I contacted the state troopers. They went to the home but he told them he was fine. Earlier today he began sending family members his "will statement." He provided instructions on who to give his belongings to because hew as going to kill himself tonight. We again contacted the state police but they have been unable to make contact. The last think he said before hanging up was that he was going to run his car into a tractor trailer because he had nothing left to live for. It is my concern that without intervention he will follow through with these threats.

## 2023-10-28 NOTE — ED NOTES
Insurance Authorization for admission:   Phone call placed to [employer] . Phone number: [phone]. Spoke to [facility]. 5 days approved. Level of care: 201 inpatient psychiatric .   Review on and Authorization # - pending placement    ID [id number]

## 2023-10-28 NOTE — ED NOTES
201 prepared. 201, rights and 72 hour notice reviewed with patient. Patient's wife at bedside. Patient in agreement and signed. Patient reported increased anxiety and is waiting to get his Ativan.

## 2023-10-28 NOTE — ED NOTES
See previous [facility] Suicide Risk Assessment. Re-screening not required unless change in behavior or suicidal ideation. Behavioral Health Assessment deferred as patient is sleeping and would benefit from additional rest.  Vital signs deferred until patient awake, no signs or symptoms of respiratory distress at this time. Once patient is awake and able to participate, will complete assessments.     1:1 remains at bedside     Sebastian Heart RN  10/28/23 8585

## 2023-10-28 NOTE — ED NOTES
Patient reports blood in stool. Patient wife showed this RN photo from stool 2 days prior that was bright red blood. Patient has hx of hemorrhoids and was checked by initial provider for same. Patient reporting continued blood in stool with mild lightheadedness. After last BM, this RN visualized toilet and minimal blood present. Provider notified.      Ezequiel Henderson RN  10/28/23 9788

## 2023-10-29 RX ORDER — LORAZEPAM 1 MG/1
1 TABLET ORAL ONCE
Status: COMPLETED | OUTPATIENT
Start: 2023-10-29 | End: 2023-10-29

## 2023-10-29 RX ADMIN — Medication 1 TABLET: at 09:23

## 2023-10-29 RX ADMIN — THIAMINE HCL TAB 100 MG 100 MG: 100 TAB at 09:23

## 2023-10-29 RX ADMIN — FOLIC ACID 1 MG: 1 TABLET ORAL at 09:23

## 2023-10-29 RX ADMIN — LORAZEPAM 1 MG: 1 TABLET ORAL at 23:11

## 2023-10-29 RX ADMIN — LORAZEPAM 1 MG: 1 TABLET ORAL at 01:22

## 2023-10-29 NOTE — ED NOTES
Patient is up and eating breakfast. Patient has no complaints at this time      Exline Cuff  10/29/23 6372

## 2023-10-29 NOTE — ED CARE HANDOFF
Emergency Department Sign Out Note        Sign out and transfer of care from Dr. Sheyla Oliveros. See Separate Emergency Department note. The patient, Pete Mcnulty, was evaluated by the previous provider for alcohol intoxication as well as depression with suicidal ideation. The patient apparently wrote a last well and testimony to his family. Patient currently is a 201 but would need to be converted to a 302 if he would like to leave due to the fact that he is high risk. .    Workup Completed:  Patient had an emergency department work-up prior to my evaluation which consisted of a TSH, rapid drug screen urinalysis alcohol, CBC, and CMP. Patient was initially found to have an alcohol of 260 however repeat breath test shows an alcohol of 46. ED Course / Workup Pending (followup): Patient was medically cleared prior to my arrival and is currently awaiting bed placement. ED Course as of 10/29/23 2237   Onesimo Maciel Oct 29, 2023   0711 Received in signout, patient drinks 2 L of wine daily, and apparently wrote a goodbye letter to his family. Patient is a 201 at present but would require 302 if he decides to leave. The patient has been medically cleared and is awaiting bed placement   2236 Case signed out to Dr. Tacho Conner pending bed placement     Procedures  Medical Decision Making  Amount and/or Complexity of Data Reviewed  Labs: ordered. Risk  OTC drugs. Prescription drug management. Decision regarding hospitalization.             Disposition  Final diagnoses:   Encounter for psychological evaluation     Time reflects when diagnosis was documented in both MDM as applicable and the Disposition within this note       Time User Action Codes Description Comment    10/28/2023  1:44 AM Melody Arroyo Add [Z00.8] Encounter for psychological evaluation           ED Disposition       ED Disposition   Transfer to [address] [place] [address]   --    Date/Time   Sat Oct 28, 2023  1:44 AM Comment   Romayne Arlington should be transferred out to [facility] and has been medically cleared. MD Documentation      [facility] Most Recent Value   Sending MD Dr. José Miguel Carreon    None       Patient's Medications   Discharge Prescriptions    No medications on file     No discharge procedures on file.        ED Provider  Electronically Signed by     Fahad Osullivan., DO  10/29/23 3998

## 2023-10-29 NOTE — ED NOTES
IV fluids not flowing. IV checked and catheter bent. Provider notified. IV removed and new IV placed per providers order.      Melissa Luevano  10/28/23 2032
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
## 2023-10-29 NOTE — ED NOTES
See previous [facility] Suicide Risk Assessment. Re-screening not required unless change in behavior or suicidal ideation. Behavioral Health Assessment deferred as patient is sleeping and would benefit from additional rest.  Vital signs deferred until patient awake, no signs or symptoms of respiratory distress at this time. Once patient is awake and able to participate, will complete assessments.        Bony Mccarthy RN  10/29/23 1112

## 2023-10-30 RX ORDER — LORAZEPAM 1 MG/1
2 TABLET ORAL ONCE
Status: COMPLETED | OUTPATIENT
Start: 2023-10-30 | End: 2023-10-30

## 2023-10-30 RX ORDER — LORAZEPAM 1 MG/1
0.5 TABLET ORAL ONCE
Status: COMPLETED | OUTPATIENT
Start: 2023-10-30 | End: 2023-10-30

## 2023-10-30 RX ORDER — ACETAMINOPHEN 325 MG/1
650 TABLET ORAL ONCE
Status: COMPLETED | OUTPATIENT
Start: 2023-10-30 | End: 2023-10-30

## 2023-10-30 RX ADMIN — LORAZEPAM 2 MG: 1 TABLET ORAL at 10:14

## 2023-10-30 RX ADMIN — FOLIC ACID 1 MG: 1 TABLET ORAL at 10:01

## 2023-10-30 RX ADMIN — ACETAMINOPHEN 650 MG: 325 TABLET ORAL at 19:30

## 2023-10-30 RX ADMIN — LORAZEPAM 0.5 MG: 1 TABLET ORAL at 19:30

## 2023-10-30 RX ADMIN — THIAMINE HCL TAB 100 MG 100 MG: 100 TAB at 10:01

## 2023-10-30 RX ADMIN — Medication 1 TABLET: at 10:01

## 2023-10-30 NOTE — ED NOTES
Patient aware that there are no beds available at [facility] [place]. Patient however continues to decline a bed search and is wanting to wait for a bed to open at [facility] [place].

## 2023-10-30 NOTE — ED NOTES
See previous [facility] Suicide Risk Assessment. Re-screening not required unless change in behavior or suicidal ideation. Behavioral Health Assessment deferred as patient is sleeping and would benefit from additional rest.  Vital signs deferred until patient awake, no signs or symptoms of respiratory distress at this time. Once patient is awake and able to participate, will complete assessments.     1:1 remains at bedside     Jean Caputo RN  10/30/23 2957

## 2023-10-30 NOTE — ED CARE HANDOFF
Emergency Department Sign Out Note        Sign out and transfer of care from Dr Shanda Lopez. See Separate Emergency Department note. The patient, Martínez Patino, was evaluated by the previous provider for SI. Workup Completed:  Psych work up    ED Course / Workup Pending (followup):                                      ED Course as of 10/30/23 0028   Mon Oct 30, 2023   0026 Received in sign out:     Pt was initially 302  Signed 201 for SI/depression  Pt should not be able to leave due to SI    Medically cleared:   Cbc, cmp, coma panel, ua  uds, ETOH done    No issues during last shift    Bed search in progress       Procedures  Medical Decision Making  Amount and/or Complexity of Data Reviewed  Labs: ordered. Risk  OTC drugs. Prescription drug management. Decision regarding hospitalization. Disposition  Final diagnoses:   Encounter for psychological evaluation     Time reflects when diagnosis was documented in both MDM as applicable and the Disposition within this note       Time User Action Codes Description Comment    10/28/2023  1:44 AM Bj Cha Add [Z00.8] Encounter for psychological evaluation           ED Disposition       ED Disposition   Transfer to [address] [place] [address]   --    Date/Time   Sat Oct 28, 2023  1:44 AM    Comment   Martínez Patino should be transferred out to [facility] and has been medically cleared. MD Documentation      Shanon Day Most Recent Value   Sending MD Dr. Esha Huber    None       Patient's Medications   Discharge Prescriptions    No medications on file     No discharge procedures on file.        ED Provider  Electronically Signed by     Hua Laws MD  10/30/23 5677

## 2023-10-30 NOTE — ED NOTES
Confirmed w/Erik, HERBERT, that referral is with intake. Per Rachele Sales, there are no older adult beds in-network at this time.

## 2023-10-31 ENCOUNTER — HOSPITAL ENCOUNTER (INPATIENT)
Facility: HOSPITAL | Age: 63
LOS: 7 days | Discharge: HOME | DRG: 751 | End: 2023-11-07
Attending: PSYCHIATRY & NEUROLOGY | Admitting: PSYCHIATRY & NEUROLOGY
Payer: COMMERCIAL

## 2023-10-31 VITALS
DIASTOLIC BLOOD PRESSURE: 72 MMHG | RESPIRATION RATE: 18 BRPM | TEMPERATURE: 98.6 F | HEIGHT: 68 IN | SYSTOLIC BLOOD PRESSURE: 142 MMHG | OXYGEN SATURATION: 98 % | HEART RATE: 87 BPM | WEIGHT: 230.6 LBS | BODY MASS INDEX: 34.95 KG/M2

## 2023-10-31 DIAGNOSIS — F33.2 MDD (MAJOR DEPRESSIVE DISORDER), RECURRENT EPISODE, SEVERE (HCC): Primary | ICD-10-CM

## 2023-10-31 DIAGNOSIS — K21.9 GERD (GASTROESOPHAGEAL REFLUX DISEASE): ICD-10-CM

## 2023-10-31 DIAGNOSIS — Z72.0 TOBACCO ABUSE: ICD-10-CM

## 2023-10-31 DIAGNOSIS — F10.10 ALCOHOL ABUSE: ICD-10-CM

## 2023-10-31 DIAGNOSIS — K92.1 BLOODY STOOL: ICD-10-CM

## 2023-10-31 DIAGNOSIS — E55.9 VITAMIN D DEFICIENCY: ICD-10-CM

## 2023-10-31 DIAGNOSIS — M19.90 DJD (DEGENERATIVE JOINT DISEASE): ICD-10-CM

## 2023-10-31 DIAGNOSIS — Z00.8 MEDICAL CLEARANCE FOR PSYCHIATRIC ADMISSION: ICD-10-CM

## 2023-10-31 DIAGNOSIS — K64.9 HEMORRHOIDS: ICD-10-CM

## 2023-10-31 RX ORDER — OLANZAPINE 10 MG/2ML
5 INJECTION, POWDER, FOR SOLUTION INTRAMUSCULAR
Status: DISCONTINUED | OUTPATIENT
Start: 2023-10-31 | End: 2023-11-07 | Stop reason: HOSPADM

## 2023-10-31 RX ORDER — POLYETHYLENE GLYCOL 3350 17 G/17G
17 POWDER, FOR SOLUTION ORAL DAILY PRN
Status: DISCONTINUED | OUTPATIENT
Start: 2023-10-31 | End: 2023-11-01

## 2023-10-31 RX ORDER — OLANZAPINE 5 MG/1
5 TABLET ORAL
Status: DISCONTINUED | OUTPATIENT
Start: 2023-10-31 | End: 2023-11-07 | Stop reason: HOSPADM

## 2023-10-31 RX ORDER — FOLIC ACID 1 MG/1
1 TABLET ORAL DAILY
Status: CANCELLED | OUTPATIENT
Start: 2023-11-01

## 2023-10-31 RX ORDER — MAGNESIUM HYDROXIDE/ALUMINUM HYDROXICE/SIMETHICONE 120; 1200; 1200 MG/30ML; MG/30ML; MG/30ML
30 SUSPENSION ORAL EVERY 4 HOURS PRN
Status: CANCELLED | OUTPATIENT
Start: 2023-10-31

## 2023-10-31 RX ORDER — LORAZEPAM 1 MG/1
1 TABLET ORAL ONCE
Status: COMPLETED | OUTPATIENT
Start: 2023-10-31 | End: 2023-10-31

## 2023-10-31 RX ORDER — OLANZAPINE 2.5 MG/1
2.5 TABLET ORAL
Status: DISCONTINUED | OUTPATIENT
Start: 2023-10-31 | End: 2023-11-07 | Stop reason: HOSPADM

## 2023-10-31 RX ORDER — BISACODYL 10 MG
10 SUPPOSITORY, RECTAL RECTAL DAILY PRN
Status: DISCONTINUED | OUTPATIENT
Start: 2023-10-31 | End: 2023-11-01

## 2023-10-31 RX ORDER — LANOLIN ALCOHOL/MO/W.PET/CERES
100 CREAM (GRAM) TOPICAL DAILY
Status: CANCELLED | OUTPATIENT
Start: 2023-11-01

## 2023-10-31 RX ORDER — TRAZODONE HYDROCHLORIDE 50 MG/1
50 TABLET ORAL
Status: DISCONTINUED | OUTPATIENT
Start: 2023-10-31 | End: 2023-11-07 | Stop reason: HOSPADM

## 2023-10-31 RX ORDER — PROPRANOLOL HYDROCHLORIDE 10 MG/1
5 TABLET ORAL EVERY 8 HOURS PRN
Status: CANCELLED | OUTPATIENT
Start: 2023-10-31

## 2023-10-31 RX ORDER — PROPRANOLOL HYDROCHLORIDE 10 MG/1
5 TABLET ORAL EVERY 8 HOURS PRN
Status: DISCONTINUED | OUTPATIENT
Start: 2023-10-31 | End: 2023-11-07 | Stop reason: HOSPADM

## 2023-10-31 RX ORDER — IBUPROFEN 400 MG/1
400 TABLET ORAL ONCE
Status: COMPLETED | OUTPATIENT
Start: 2023-10-31 | End: 2023-10-31

## 2023-10-31 RX ORDER — ACETAMINOPHEN 325 MG/1
650 TABLET ORAL ONCE
Status: COMPLETED | OUTPATIENT
Start: 2023-10-31 | End: 2023-10-31

## 2023-10-31 RX ORDER — OLANZAPINE 2.5 MG/1
5 TABLET ORAL
Status: CANCELLED | OUTPATIENT
Start: 2023-10-31

## 2023-10-31 RX ORDER — LORAZEPAM 2 MG/ML
1 INJECTION INTRAMUSCULAR
Status: CANCELLED | OUTPATIENT
Start: 2023-10-31

## 2023-10-31 RX ORDER — BENZTROPINE MESYLATE 1 MG/ML
1 INJECTION INTRAMUSCULAR; INTRAVENOUS
Status: DISCONTINUED | OUTPATIENT
Start: 2023-10-31 | End: 2023-11-07 | Stop reason: HOSPADM

## 2023-10-31 RX ORDER — POLYETHYLENE GLYCOL 3350 17 G/17G
17 POWDER, FOR SOLUTION ORAL DAILY PRN
Status: CANCELLED | OUTPATIENT
Start: 2023-10-31

## 2023-10-31 RX ORDER — LORAZEPAM 1 MG/1
1 TABLET ORAL EVERY 8 HOURS PRN
Status: CANCELLED | OUTPATIENT
Start: 2023-10-31

## 2023-10-31 RX ORDER — IBUPROFEN 200 MG
200 TABLET ORAL EVERY 6 HOURS PRN
Status: CANCELLED | OUTPATIENT
Start: 2023-10-31

## 2023-10-31 RX ORDER — OLANZAPINE 10 MG/2ML
5 INJECTION, POWDER, FOR SOLUTION INTRAMUSCULAR
Status: CANCELLED | OUTPATIENT
Start: 2023-10-31

## 2023-10-31 RX ORDER — HYDROXYZINE 50 MG/1
25 TABLET, FILM COATED ORAL
Status: CANCELLED | OUTPATIENT
Start: 2023-10-31

## 2023-10-31 RX ORDER — IBUPROFEN 600 MG/1
600 TABLET ORAL EVERY 8 HOURS PRN
Status: DISCONTINUED | OUTPATIENT
Start: 2023-10-31 | End: 2023-11-07 | Stop reason: HOSPADM

## 2023-10-31 RX ORDER — MAGNESIUM HYDROXIDE/ALUMINUM HYDROXICE/SIMETHICONE 120; 1200; 1200 MG/30ML; MG/30ML; MG/30ML
30 SUSPENSION ORAL EVERY 4 HOURS PRN
Status: DISCONTINUED | OUTPATIENT
Start: 2023-10-31 | End: 2023-11-07 | Stop reason: HOSPADM

## 2023-10-31 RX ORDER — TRAZODONE HYDROCHLORIDE 50 MG/1
50 TABLET ORAL
Status: CANCELLED | OUTPATIENT
Start: 2023-10-31

## 2023-10-31 RX ORDER — OLANZAPINE 2.5 MG/1
2.5 TABLET ORAL
Status: CANCELLED | OUTPATIENT
Start: 2023-10-31

## 2023-10-31 RX ORDER — PANTOPRAZOLE SODIUM 40 MG/1
40 TABLET, DELAYED RELEASE ORAL
Status: CANCELLED | OUTPATIENT
Start: 2023-11-01

## 2023-10-31 RX ORDER — IBUPROFEN 400 MG/1
400 TABLET ORAL EVERY 6 HOURS PRN
Status: DISCONTINUED | OUTPATIENT
Start: 2023-10-31 | End: 2023-11-07 | Stop reason: HOSPADM

## 2023-10-31 RX ORDER — BISACODYL 10 MG
10 SUPPOSITORY, RECTAL RECTAL DAILY PRN
Status: CANCELLED | OUTPATIENT
Start: 2023-10-31

## 2023-10-31 RX ORDER — FOLIC ACID 1 MG/1
1 TABLET ORAL DAILY
Status: DISCONTINUED | OUTPATIENT
Start: 2023-11-01 | End: 2023-11-07 | Stop reason: HOSPADM

## 2023-10-31 RX ORDER — BENZTROPINE MESYLATE 1 MG/ML
1 INJECTION INTRAMUSCULAR; INTRAVENOUS
Status: CANCELLED | OUTPATIENT
Start: 2023-10-31

## 2023-10-31 RX ORDER — LORAZEPAM 2 MG/ML
1 INJECTION INTRAMUSCULAR
Status: DISCONTINUED | OUTPATIENT
Start: 2023-10-31 | End: 2023-11-03

## 2023-10-31 RX ORDER — BENZTROPINE MESYLATE 0.5 MG/1
0.5 TABLET ORAL
Status: DISCONTINUED | OUTPATIENT
Start: 2023-10-31 | End: 2023-11-07 | Stop reason: HOSPADM

## 2023-10-31 RX ORDER — HYDROXYZINE 50 MG/1
50 TABLET, FILM COATED ORAL
Status: CANCELLED | OUTPATIENT
Start: 2023-10-31

## 2023-10-31 RX ORDER — LORAZEPAM 1 MG/1
1 TABLET ORAL EVERY 8 HOURS PRN
Status: DISCONTINUED | OUTPATIENT
Start: 2023-10-31 | End: 2023-11-03

## 2023-10-31 RX ORDER — HYDROXYZINE HYDROCHLORIDE 25 MG/1
25 TABLET, FILM COATED ORAL
Status: DISCONTINUED | OUTPATIENT
Start: 2023-10-31 | End: 2023-11-03

## 2023-10-31 RX ORDER — BENZTROPINE MESYLATE 0.5 MG/1
0.5 TABLET ORAL
Status: CANCELLED | OUTPATIENT
Start: 2023-10-31

## 2023-10-31 RX ORDER — OLANZAPINE 10 MG/2ML
10 INJECTION, POWDER, FOR SOLUTION INTRAMUSCULAR ONCE
Status: DISCONTINUED | OUTPATIENT
Start: 2023-10-31 | End: 2023-10-31 | Stop reason: HOSPADM

## 2023-10-31 RX ORDER — PANTOPRAZOLE SODIUM 40 MG/1
40 TABLET, DELAYED RELEASE ORAL
Status: DISCONTINUED | OUTPATIENT
Start: 2023-11-01 | End: 2023-11-07 | Stop reason: HOSPADM

## 2023-10-31 RX ORDER — AMOXICILLIN 250 MG
1 CAPSULE ORAL DAILY PRN
Status: CANCELLED | OUTPATIENT
Start: 2023-10-31

## 2023-10-31 RX ORDER — HYDROXYZINE 50 MG/1
50 TABLET, FILM COATED ORAL
Status: DISCONTINUED | OUTPATIENT
Start: 2023-10-31 | End: 2023-11-03

## 2023-10-31 RX ORDER — IBUPROFEN 400 MG/1
400 TABLET ORAL EVERY 6 HOURS PRN
Status: CANCELLED | OUTPATIENT
Start: 2023-10-31

## 2023-10-31 RX ORDER — LANOLIN ALCOHOL/MO/W.PET/CERES
100 CREAM (GRAM) TOPICAL DAILY
Status: DISCONTINUED | OUTPATIENT
Start: 2023-11-01 | End: 2023-11-07 | Stop reason: HOSPADM

## 2023-10-31 RX ORDER — AMOXICILLIN 250 MG
1 CAPSULE ORAL DAILY PRN
Status: DISCONTINUED | OUTPATIENT
Start: 2023-10-31 | End: 2023-11-07 | Stop reason: HOSPADM

## 2023-10-31 RX ORDER — IBUPROFEN 400 MG/1
200 TABLET ORAL EVERY 6 HOURS PRN
Status: DISCONTINUED | OUTPATIENT
Start: 2023-10-31 | End: 2023-11-07 | Stop reason: HOSPADM

## 2023-10-31 RX ADMIN — OLANZAPINE 5 MG: 5 TABLET, FILM COATED ORAL at 22:54

## 2023-10-31 RX ADMIN — ACETAMINOPHEN 650 MG: 325 TABLET ORAL at 08:35

## 2023-10-31 RX ADMIN — LORAZEPAM 1 MG: 1 TABLET ORAL at 15:54

## 2023-10-31 RX ADMIN — PANTOPRAZOLE SODIUM 40 MG: 40 TABLET, DELAYED RELEASE ORAL at 08:32

## 2023-10-31 RX ADMIN — Medication 1 TABLET: at 08:32

## 2023-10-31 RX ADMIN — LORAZEPAM 1 MG: 1 TABLET ORAL at 08:31

## 2023-10-31 RX ADMIN — FOLIC ACID 1 MG: 1 TABLET ORAL at 08:31

## 2023-10-31 RX ADMIN — IBUPROFEN 400 MG: 400 TABLET ORAL at 18:55

## 2023-10-31 RX ADMIN — THIAMINE HCL TAB 100 MG 100 MG: 100 TAB at 08:32

## 2023-10-31 NOTE — ED CARE HANDOFF
Emergency Department Sign Out Note        Sign out and transfer of care from Dr Guicho Medel. See Separate Emergency Department note. The patient, Alysia Alvarez, was evaluated by the previous provider for SI. Workup Completed:  Psych clearance    ED Course / Workup Pending (followup):                                    ED Course as of 10/30/23 2143   Mon Oct 30, 2023   0026 Received in sign out:     Pt was initially 302  Signed 201 for SI/depression  Pt should not be able to leave due to SI    Medically cleared:   Cbc, cmp, coma panel, ua  uds, ETOH done    No issues during last shift    Bed search in progress     2124 Sign out:     Pt here for SI/depressed  Signed 201  Pt cannot leave    No issues over last shift      Procedures  Medical Decision Making  Amount and/or Complexity of Data Reviewed  Labs: ordered. Risk  OTC drugs. Prescription drug management. Decision regarding hospitalization. Disposition  Final diagnoses:   Encounter for psychological evaluation     Time reflects when diagnosis was documented in both MDM as applicable and the Disposition within this note       Time User Action Codes Description Comment    10/28/2023  1:44 AM Marcoona Class Add [Z00.8] Encounter for psychological evaluation           ED Disposition       ED Disposition   Transfer to [address] [place] [address]   --    Date/Time   Sat Oct 28, 2023  1:44 AM    Comment   Alysia Alvarez should be transferred out to [facility] and has been medically cleared. MD Documentation      Vicky Foley Most Recent Value   Sending MD Dr. Ena Wallace    None       Patient's Medications   Discharge Prescriptions    No medications on file     No discharge procedures on file.        ED Provider  Electronically Signed by     Santiago Snyder MD  10/30/23 0274

## 2023-10-31 NOTE — ED NOTES
Insurance Authorization for admission:   Phone call placed to [employer] . Phone number: [phone]. Spoke to Maxwell Quintero. 5 days approved. Level of care: 201 inpatient psychiatric .   Review on and Authorization # - pending placement     ID [id number]

## 2023-10-31 NOTE — ED NOTES
TT sent to Padmini Casanova, to inquire if there are any available beds for pt @ [facility].  Bhargav Evans is currently reviewing pt referral.

## 2023-10-31 NOTE — ED NOTES
Patient is accepted at [facility] [place] [facility]  Patient is accepted by Dr. Radha Celeste is arranged with [facility]      Transportation is scheduled for 1915  Patient may go to the floor after 1915         Nurse report is to be called to [phone] prior to patient transfer.

## 2023-10-31 NOTE — ED NOTES
Discussed with patients significant other that due to him being the [address] [place] [address] room that she cannot sleep in the room overnight. Offered the patient blankets to stay in the waiting room and informed her of the hotel down the road. Pts significant other understanding of situation and will stay in waiting room till 0800 for visiting hours.      Sven White RN  10/31/23 3221

## 2023-11-01 PROBLEM — M19.90 DJD (DEGENERATIVE JOINT DISEASE): Status: ACTIVE | Noted: 2023-11-01

## 2023-11-01 PROBLEM — F33.2 MDD (MAJOR DEPRESSIVE DISORDER), RECURRENT EPISODE, SEVERE (HCC): Status: ACTIVE | Noted: 2023-11-01

## 2023-11-01 PROBLEM — K21.9 GERD (GASTROESOPHAGEAL REFLUX DISEASE): Status: ACTIVE | Noted: 2023-11-01

## 2023-11-01 PROBLEM — F10.10 ALCOHOL ABUSE: Status: ACTIVE | Noted: 2023-11-01

## 2023-11-01 LAB
25(OH)D3 SERPL-MCNC: 26.3 NG/ML (ref 30–100)
ATRIAL RATE: 81 BPM
CHOLEST SERPL-MCNC: 206 MG/DL
FOLATE SERPL-MCNC: >22.3 NG/ML
HDLC SERPL-MCNC: 62 MG/DL
LDLC SERPL CALC-MCNC: 120 MG/DL (ref 0–100)
NONHDLC SERPL-MCNC: 144 MG/DL
P AXIS: 52 DEGREES
PR INTERVAL: 132 MS
QRS AXIS: -7 DEGREES
QRSD INTERVAL: 94 MS
QT INTERVAL: 372 MS
QTC INTERVAL: 432 MS
T WAVE AXIS: 17 DEGREES
TRIGL SERPL-MCNC: 119 MG/DL
VENTRICULAR RATE: 81 BPM
VIT B12 SERPL-MCNC: 529 PG/ML (ref 180–914)

## 2023-11-01 PROCEDURE — 82306 VITAMIN D 25 HYDROXY: CPT | Performed by: FAMILY MEDICINE

## 2023-11-01 PROCEDURE — 99223 1ST HOSP IP/OBS HIGH 75: CPT | Performed by: PSYCHIATRY & NEUROLOGY

## 2023-11-01 PROCEDURE — 93010 ELECTROCARDIOGRAM REPORT: CPT | Performed by: INTERNAL MEDICINE

## 2023-11-01 PROCEDURE — 93005 ELECTROCARDIOGRAM TRACING: CPT

## 2023-11-01 PROCEDURE — 80061 LIPID PANEL: CPT | Performed by: PSYCHIATRY & NEUROLOGY

## 2023-11-01 PROCEDURE — 82607 VITAMIN B-12: CPT | Performed by: FAMILY MEDICINE

## 2023-11-01 PROCEDURE — 82746 ASSAY OF FOLIC ACID SERUM: CPT | Performed by: FAMILY MEDICINE

## 2023-11-01 RX ORDER — BISACODYL 10 MG
10 SUPPOSITORY, RECTAL RECTAL DAILY PRN
Status: DISCONTINUED | OUTPATIENT
Start: 2023-11-01 | End: 2023-11-07 | Stop reason: HOSPADM

## 2023-11-01 RX ORDER — GABAPENTIN 100 MG/1
100 CAPSULE ORAL 3 TIMES DAILY
Status: DISCONTINUED | OUTPATIENT
Start: 2023-11-01 | End: 2023-11-02

## 2023-11-01 RX ORDER — POLYETHYLENE GLYCOL 3350 17 G/17G
17 POWDER, FOR SOLUTION ORAL DAILY PRN
Status: DISCONTINUED | OUTPATIENT
Start: 2023-11-01 | End: 2023-11-07 | Stop reason: HOSPADM

## 2023-11-01 RX ORDER — MIRTAZAPINE 15 MG/1
7.5 TABLET, FILM COATED ORAL
Status: DISCONTINUED | OUTPATIENT
Start: 2023-11-01 | End: 2023-11-02

## 2023-11-01 RX ORDER — HYDROCORTISONE 25 MG/G
CREAM TOPICAL 4 TIMES DAILY PRN
Status: DISCONTINUED | OUTPATIENT
Start: 2023-11-01 | End: 2023-11-07 | Stop reason: HOSPADM

## 2023-11-01 RX ADMIN — FOLIC ACID 1 MG: 1 TABLET ORAL at 08:33

## 2023-11-01 RX ADMIN — LORAZEPAM 1 MG: 1 TABLET ORAL at 08:37

## 2023-11-01 RX ADMIN — LORAZEPAM 1 MG: 1 TABLET ORAL at 16:55

## 2023-11-01 RX ADMIN — THIAMINE HCL TAB 100 MG 100 MG: 100 TAB at 08:33

## 2023-11-01 RX ADMIN — GABAPENTIN 100 MG: 100 CAPSULE ORAL at 16:55

## 2023-11-01 RX ADMIN — Medication 1 TABLET: at 08:33

## 2023-11-01 NOTE — TREATMENT PLAN
TREATMENT PLAN REVIEW - [facility] Edd Tracy 66 y.o. 1960 male MRN: [id number]    [address] [address] Room / Bed: Jeremías Montemayor/OABHU 805-07 Encounter: [id number]          Admit Date/Time:  10/31/2023  8:07 PM    Treatment Team: Attending Provider: Spenser Carroll MD; Patient Care Assistant: Edith Lagos; Patient Care Assistant: Brittany Hamilton; Care Manager: Andrea Fuentes RN; : [place]; Certified Nursing Assistant: Gerardo Weber; Patient Care Assistant: Clarita Islas    Diagnosis: Principal Problem:    MDD (major depressive disorder), recurrent episode, severe ([address])  Active Problems:    Alcohol abuse    GERD (gastroesophageal reflux disease)    DJD (degenerative joint disease)      Patient Strengths/Assets: family ties, negotiates basic needs, patient is on a voluntary commitment    Patient Barriers/Limitations: difficulty adapting, financial instability, poor self-care, substance abuse    Short Term Goals: decrease in depressive symptoms, decrease in anxiety symptoms, decrease in suicidal thoughts, decrease in level of agitation, ability to stay safe on the unit, improvement in reasoning ability, improvement in self care, sleep improvement, improvement in appetite, mood stabilization, increase in group attendance, increase in socialization with peers on the unit, acceptance of need for psychiatric treatment, acceptance of psychiatric medications    Long Term Goals: improvement in depression, improvement in anxiety, stabilization of mood, free of suicidal thoughts, improvement in reasoning ability, improved insight, acceptance of need for psychiatric medications, acceptance of need for psychiatric treatment, adequate self care, adequate sleep, adequate appetite, adequate oral intake, appropriate interaction with peers    Progress Towards Goals: starting psychiatric medications as prescribed    Recommended Treatment: medication management, patient medication education, group therapy, milieu therapy, continued Behavioral Health psychiatric evaluation/assessment process, medical follow up with medical team    Treatment Frequency: daily medication monitoring, group and milieu therapy daily, monitoring through interdisciplinary rounds, monitoring through weekly patient care conferences    Expected Discharge Date: 10 midnights    Discharge Plan: will be determined    Treatment Plan Created/Updated By: Mary Grace Yo MD
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
## 2023-11-01 NOTE — PROGRESS NOTES
11/01/23 1300   Activity/Group Checklist   Group Admission/Discharge   Attendance Attended   Attendance Duration (min) 16-30   Interactions Interacted appropriately   Affect/Mood Appropriate   Goals Achieved Identified feelings; Identified triggers; Identified relapse prevention strategies; Discussed discharge plans; Discussed coping strategies; Identified resources and support systems; Able to listen to others; Able to engage in interactions; Able to reflect/comment on own behavior;Able to manage/cope with feelings; Able to self-disclose; Able to recieve feedback     Pt agreeable to mmet and complete his Self assessment and relapse prevention plan with [facility].

## 2023-11-01 NOTE — H&P
Jonah Farley#  [id number] Joey Funez  [facility]:[id number]    QNO:[id number]  Adm Date: 10/31/2023 2007  8:07 PM   ATT PHY: Shirlene Cabral, [facility]         Chief Complaint:   Worsening depression and anxiety    History of Presenting Illness: Alysia Alvarez is a(n) 66y.o. year old male was admitted through ED due to worsening depression symptoms along with suicidal threats. 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 Patient examined at bedside. Patient denied any active suicidal homicidal ideations. No Known Allergies    Current Facility-Administered Medications on File Prior to Encounter   Medication Dose Route Frequency Provider Last Rate Last Admin    [COMPLETED] ibuprofen (MOTRIN) tablet 400 mg  400 mg Oral Once Go Stone MD   400 mg at 10/31/23 1855    [COMPLETED] LORazepam (ATIVAN) tablet 1 mg  1 mg Oral Once Derick Dickens MD   1 mg at 10/31/23 1554    [DISCONTINUED] folic acid (FOLVITE) tablet 1 mg  1 mg Oral Daily Prakash Evans, DO   1 mg at 10/31/23 0934    [DISCONTINUED] LORazepam (ATIVAN) injection 1 mg  1 mg Intravenous Once Go Stone MD        [DISCONTINUED] multivitamin-minerals (CENTRUM) tablet 1 tablet  1 tablet Oral Daily Christin Pal, DO   1 tablet at 10/31/23 1542    [DISCONTINUED] OLANZapine (ZyPREXA) IM injection 10 mg  10 mg Intramuscular Once Derick Dickens MD        [DISCONTINUED] pantoprazole (PROTONIX) EC tablet 40 mg  40 mg Oral Early Morning Prakash Evans DO   40 mg at 10/31/23 5944    [DISCONTINUED] thiamine tablet 100 mg  100 mg Oral Daily Prakash Evans DO   100 mg at 10/31/23 9307     Current Outpatient Medications on File Prior to Encounter   Medication Sig Dispense Refill    loperamide (IMODIUM) 2 mg capsule Take 1 capsule (2 mg total) by mouth 4 (four) times a day as needed for diarrhea.  12 capsule 0       Active Ambulatory Problems Diagnosis Date Noted    No Active Ambulatory Problems     Resolved Ambulatory Problems     Diagnosis Date Noted    No Resolved Ambulatory Problems     Past Medical History:   Diagnosis Date    Anxiety     Constipation     ETOH abuse     Hemorrhoids     No known health problems        Past Surgical History:   Procedure Laterality Date    LUNG LOBECTOMY      PLEURAL SCARIFICATION         Social History:   Social History     Socioeconomic History    Marital status: /Civil Union     Spouse name: None    Number of children: None    Years of education: None    Highest education level: None   Occupational History    None   Tobacco Use    Smoking status: Former    Smokeless tobacco: Never    Tobacco comments:     25 years ago   Vaping Use    Vaping Use: Never used   Substance and Sexual Activity    Alcohol use:  Yes     Alcohol/week: 70.0 standard drinks of alcohol     Types: 70 Glasses of wine per week     Comment: social    Drug use: No    Sexual activity: None   Other Topics Concern    None   Social History Narrative    None     Social Determinants of Health     Financial Resource Strain: Not on file   Food Insecurity: Not on file   Transportation Needs: Not on file   Physical Activity: Not on file   Stress: Not on file   Social Connections: Not on file   Intimate Partner Violence: Not on file   Housing Stability: Not on file       Family History:   Family History   Problem Relation Age of Onset    Alzheimer's disease Mother     Heart failure Father     Kidney disease Father     Kidney failure Father     Hyperlipidemia Father     No Known Problems Sister     No Known Problems Brother     No Known Problems Maternal Grandmother     No Known Problems Maternal Grandfather     No Known Problems Paternal Grandmother     No Known Problems Paternal Grandfather     No Known Problems Maternal Aunt     No Known Problems Maternal Uncle     No Known Problems Paternal Aunt     No Known Problems Paternal Uncle     No Known Problems Cousin        Review of Systems   Gastrointestinal:  Positive for constipation and rectal pain. Musculoskeletal:  Positive for arthralgias. All other systems reviewed and are negative. Physical Exam   Vitals: Blood pressure 131/72, pulse 70, temperature 98 °F (36.7 °C), temperature source Temporal, resp. rate 18, height 5' 8" (1.727 m), weight 102 kg (225 lb 12.8 oz), SpO2 96 %. ,Body mass index is 34.33 kg/m². Constitutional: Awake and Alert. Well-developed and well-nourished. No distress. HENT: PERR,EOMI, conjunctiva normal  Head: Normocephalic and atraumatic. Mouth/Throat: Oropharynx is clear and moist.    Eyes: Conjunctivae and EOM are normal. Pupils are equal, round, and reactive to light. Right and left eye exhibits no discharge. Neck: Neck supple. No tracheal deviation present. No thyromegaly present. Cardiovascular: Normal rate, regular rhythm and normal heart sounds. Exam reveals no friction rub. No murmur heard. Pulmonary/Chest: Effort normal and breath sounds normal. No respiratory distress. She has no wheezes. Abdominal: Soft. Bowel sounds are normal. She exhibits no distension. There is no tenderness. There is no rebound and no guarding. Neurological: Cranial Nerves grossly intact. No sensory deficit. Coordination normal.   Musculoskeletal:   Nontender spine  Skin: Skin is warm and dry. No rash noted. No diaphoresis. No erythema. No edema. No cyanosis. Assessment     Benjie Avelar is a(n) 66y.o. year old male with MDD    1. Alcohol abuse. Patient has been put on thiamine, folic acid and multivitamin. 2.  GERD. Patient is on Protonix 40 mg daily. 3.  Arthralgia/headache. Patient may get ibuprofen as needed. 4.  Hemorrhoids. I will put the patient on Anusol cream as needed. 5.  Psych with MDD. Patient is being managed by psych. Prognosis: Fair. Discharge Plan: In progress.     Advanced Directives: I have discussed in detail the patient the advanced directives. Patient has not appointed anybody as his POA and has no living will with advanced healthcare directives. Patient's first contact is his spouse Electa Osler and her phone number is [phone]. Buck Adhikari When discussing cardiac and pulmonary resuscitation efforts with the patient, the patient wishes to be  FULL CODE. I have spent more than 50 minutes gathering data, doing physical examination, and discussing the advanced directives, which was witnessed by caring staff.

## 2023-11-01 NOTE — PROGRESS NOTES
Pt Belongings    Remains with pt:  Black socks  Grey t-shirt  Grey underwear  Jeans  Black slides  Black Glasses    Locked:  [facility] [employer]        Security:  #[id number]  Black wallet  Debit card  Visa card  Misc.cards  74 dollars cash  3 -20s  2 -5s  4-1s   3.95 in coins  1 drivers license    [place] #16  Iphone-broken case  Ipad-cracked screen   Car keys
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
## 2023-11-01 NOTE — NURSING NOTE
Patient visible in milieu, pleasant and cooperative in interaction. Social with staff and select peers. Patient endorsed moderate anxiety, denies depression, SI/HI, hallucinations. CIWA 6, PRN Ativan given as ordered for CIWA with positive effect. Attended 2 groups thus far. Remains medication compliant and on 7" checks for safety and behaviors.

## 2023-11-01 NOTE — PROGRESS NOTES
11/01/23   Team Meeting   Meeting Type Tx Team Meeting   Team Members Present   Team Members Present Physician;Nurse;   Physician Team Member Dr. Margie Gomez MD   Nursing Team Member Bijal Tejeda RN   Social Work Team Member Teri MCKEON   Patient/Family Present   Patient Present Yes   Patient's Family Present No     Treatment team met with pt to review care plan and goals. All in agreement with treatment plan; all signed. Original to be sent to be scanned to pt chart; copy to pt chart.

## 2023-11-01 NOTE — PLAN OF CARE
Problem: Ineffective Coping  Goal: Cooperates with admission process  Description: Interventions:   - Complete admission process  Outcome: Progressing   New admit as of 10/31/2023
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
## 2023-11-02 PROCEDURE — 99232 SBSQ HOSP IP/OBS MODERATE 35: CPT | Performed by: PSYCHIATRY & NEUROLOGY

## 2023-11-02 RX ORDER — GABAPENTIN 100 MG/1
100 CAPSULE ORAL 3 TIMES DAILY
Status: DISCONTINUED | OUTPATIENT
Start: 2023-11-02 | End: 2023-11-07 | Stop reason: HOSPADM

## 2023-11-02 RX ORDER — MIRTAZAPINE 15 MG/1
15 TABLET, FILM COATED ORAL
Status: DISCONTINUED | OUTPATIENT
Start: 2023-11-02 | End: 2023-11-07 | Stop reason: HOSPADM

## 2023-11-02 RX ORDER — GABAPENTIN 300 MG/1
300 CAPSULE ORAL 3 TIMES DAILY
Status: DISCONTINUED | OUTPATIENT
Start: 2023-11-02 | End: 2023-11-02

## 2023-11-02 RX ORDER — NALTREXONE HYDROCHLORIDE 50 MG/1
25 TABLET, FILM COATED ORAL DAILY
Status: DISCONTINUED | OUTPATIENT
Start: 2023-11-03 | End: 2023-11-03

## 2023-11-02 RX ORDER — ERGOCALCIFEROL 1.25 MG/1
50000 CAPSULE ORAL WEEKLY
Status: DISCONTINUED | OUTPATIENT
Start: 2023-11-03 | End: 2023-11-07 | Stop reason: HOSPADM

## 2023-11-02 RX ORDER — MELATONIN
1000 DAILY
Status: DISCONTINUED | OUTPATIENT
Start: 2023-12-09 | End: 2023-11-07 | Stop reason: HOSPADM

## 2023-11-02 RX ADMIN — Medication 1 TABLET: at 08:19

## 2023-11-02 RX ADMIN — GABAPENTIN 100 MG: 100 CAPSULE ORAL at 15:55

## 2023-11-02 RX ADMIN — GABAPENTIN 100 MG: 100 CAPSULE ORAL at 08:19

## 2023-11-02 RX ADMIN — FOLIC ACID 1 MG: 1 TABLET ORAL at 08:19

## 2023-11-02 RX ADMIN — LORAZEPAM 1 MG: 1 TABLET ORAL at 21:55

## 2023-11-02 RX ADMIN — IBUPROFEN 400 MG: 400 TABLET ORAL at 10:43

## 2023-11-02 RX ADMIN — THIAMINE HCL TAB 100 MG 100 MG: 100 TAB at 08:19

## 2023-11-02 RX ADMIN — LORAZEPAM 1 MG: 1 TABLET ORAL at 09:44

## 2023-11-02 NOTE — PROGRESS NOTES
Progress Note - Behavioral Health     Guadelupe Holstein 66 y.o. male MRN: [id number]   Unit/Bed#: Dana Notice 203-02 Encounter: [id number]    Behavior over the last 24 hours: some improvement. Joshua Suárez seen today, appears anxious. He states that when he took the gabapentin last night he got anxious and dizzy, but when he took it this morning he did not have any side effects. He states that he has been feeling anxious, but that he got ativan at 0944 which helped. He states that he would like to get off the benzos and alcohol, and that he knows the benzos will need to be tapered. He states that he has been meditating and praying when he is anxious, both of which help calm him. He states that he is not currently depressed, only anxious. He states that when he feels anxious, he gets palpitations, decreased concentration, and racing thoughts. He denies any current SI/HI/AVH. He refused his remeron last night. Staff report he is pleasant and cooperative in groups and on the unit. Sleep: slept better  Appetite: normal  Medication side effects: Dizziness and anxiety with gabapentin last night, no side effects with morning dose.    ROS: all other systems are negative    Mental Status Evaluation:    Appearance:  dressed in hospital attire, looks older than stated age, overweight   Behavior:  pleasant, cooperative, calm   Speech:  normal rate, normal volume   Mood:  "anxious"   Affect:  constricted   Thought Process:  goal directed, linear   Associations: intact associations   Thought Content:  no overt delusions   Perceptual Disturbances: no auditory hallucinations, no visual hallucinations   Risk Potential: Suicidal ideation - None at present  Homicidal ideation - None   Sensorium:  oriented to person, place, and time/date   Memory:  recent and remote memory grossly intact   Consciousness:  alert and awake   Attention: attention span and concentration are age appropriate   Insight:  poor   Judgment: limited   Gait/Station: normal gait/station   Motor Activity: no abnormal movements     Vital signs in last 24 hours:    Temp:  [97.4 °F (36.3 °C)-98.4 °F (36.9 °C)] 97.4 °F (36.3 °C)  HR:  [85-96] 86  Resp:  [18] 18  BP: (124-152)/(75-87) 124/75    Laboratory results: I have personally reviewed all pertinent laboratory/tests results. Most Recent Labs:   Lab Results   Component Value Date    WBC 6.61 10/28/2023    RBC 4.04 10/28/2023    HGB 13.8 10/28/2023    HCT 41.5 10/28/2023     10/28/2023    RDW 12.7 10/28/2023    NEUTROABS 3.40 10/28/2023    SODIUM 139 10/28/2023    K 4.2 10/28/2023     10/28/2023    CO2 23 10/28/2023    BUN 7 10/28/2023    CREATININE 0.86 10/28/2023    GLUC 112 10/28/2023    CALCIUM 9.1 10/28/2023    AST 93 (H) 10/28/2023    ALT 64 (H) 10/28/2023    ALKPHOS 106 (H) 10/28/2023    TP 7.8 10/28/2023    ALB 4.5 10/28/2023    TBILI 0.48 10/28/2023    CHOLESTEROL 206 (H) 11/01/2023    HDL 62 11/01/2023    TRIG 119 11/01/2023    LDLCALC 120 (H) 11/01/2023    [address] [facility] [address] 11/01/2023    [id number] 2.505 10/28/2023       Assessment/Plan   Principal Problem:    MDD (major depressive disorder), recurrent episode, severe (HCC)  Active Problems:    Alcohol abuse    GERD (gastroesophageal reflux disease)    DJD (degenerative joint disease)    Recommended Treatment:     Planned medication and treatment changes: All current active medications have been reviewed  Encourage group therapy, milieu therapy and occupational therapy  Behavioral Health checks every 7 minutes  Increase Remeron to 15 mg po hs.   Natrexone 25 mg po daily    Current Facility-Administered Medications   Medication Dose Route Frequency Provider Last Rate    aluminum-magnesium hydroxide-simethicone  30 mL Oral Q4H PRN Dory Meade PA-C      benztropine  1 mg Intramuscular Q4H PRN Max 6/day [facility]      benztropine  0.5 mg Oral Q4H PRN Max 6/day Christine Aus [facility], NAZIA      bisacodyl  10 mg Rectal Daily PRN Kelle Cruz, MD      folic acid  1 mg Oral Daily [facility], [facility]      gabapentin  100 mg Oral TID Holland Sams MD      glycerin-hypromellose-  1 drop Both Eyes Q3H PRN Carmenza [place] Stives, PA-C      hydrocortisone   Topical 4x Daily PRN Merle Shook MD      hydrOXYzine HCL  25 mg Oral Q6H PRN Max 4/day [facility], [facility]      hydrOXYzine HCL  50 mg Oral Q6H PRN Max 4/day [facility], PA[facility]      ibuprofen  200 mg Oral Q6H PRN Carmenza [place] Stives, PA-C      ibuprofen  400 mg Oral Q6H PRN Carmenza [place] Stives, PA-C      ibuprofen  600 mg Oral Q8H PRN Carmenza [place] Stives, PA-C      LORazepam  1 mg Intramuscular Q6H PRN Max 3/day Carmenza [place] Stives, PA-C      LORazepam  1 mg Oral Q8H PRN Carmenza [place] Stives, PA-C      mirtazapine  7.5 mg Oral HS Holland Sams MD      multivitamin-minerals  1 tablet Oral Daily Carmenza [place], [place]      nicotine polacrilex  4 mg Oral Q2H PRN Carmenza [place] Stives, PA-C      OLANZapine  5 mg Intramuscular Q3H PRN Max 3/day Carmenza [place] Stives, PA-C      OLANZapine  2.5 mg Oral Q4H PRN Max 6/day Carmenza [place] [facility], PA-C      OLANZapine  5 mg Oral Q4H PRN Max 3/day Carmenza [place] Stives, PA-C      OLANZapine  5 mg Oral Q3H PRN Max 3/day Carmenza [place] Stives, PA-C      pantoprazole  40 mg Oral Early Morning Carmenza [place] Stives, PA-C      polyethylene glycol  17 g Oral Daily PRN Holland Sams MD      propranolol  5 mg Oral Q8H PRN Nathan Stacy, PA-C      senna-docusate sodium  1 tablet Oral Daily PRN Carmenza [place] Stives, PA-C      thiamine  100 mg Oral Daily [facility], [facility]      traZODone  50 mg Oral HS PRN Nathan Stacy, PA-C      traZODone  50 mg Oral Q6H PRN Max 3/day Carmenza [place] Stives, PA-C         Risks / Benefits of Treatment:    Risks, benefits, and possible side effects of medications explained to patient and patient verbalizes understanding and agreement for treatment.     Counseling / Coordination of Care:    Patient's progress discussed with staff in treatment team meeting. Medications, treatment progress and treatment plan reviewed with patient. Supportive therapy provided to patient. Group attendance encouraged.     Arnol Stiles MD 11/02/23

## 2023-11-02 NOTE — NURSING NOTE
Pt is pleasant & cooperative. Pt c/o mild anxiety & denies any depression. Pt denies any hallucinations, suicidal or homicidal ideations. CIWA - 1 this AM. Q 7 min checks maintained to monitor pt's behavior & safety. Pt is compliant with medications. Pt denies any light-headedness this AM & did take Neurontin po . Pt is selectively social with other patients.

## 2023-11-02 NOTE — NURSING NOTE
Pt medicated for c/o increased anxiety @ 0944 with Ativan 1 mg po with moderate relief obtained. Shital Hy - 19 @ that time. Q 7 min checks maintained to monitor pt's behavior & safety. Андрей Speaker

## 2023-11-02 NOTE — PROGRESS NOTES
Progress Note - Marilee Griffin 66 y.o. male MRN: [id number]    Unit/Bed#: Jasbir Cancino 809-23 Encounter: [id number]        Subjective:   Patient seen and examined at bedside after reviewing the chart and discussing the case with the caring staff. Patient examined at bedside. Patient has no acute complaints. Physical Exam   Vitals: Blood pressure 124/75, pulse 86, temperature (!) 97.4 °F (36.3 °C), temperature source Temporal, resp. rate 18, height 5' 8" (1.727 m), weight 102 kg (225 lb 12.8 oz), SpO2 97 %. ,Body mass index is 34.33 kg/m². Constitutional: Patient in no acute distress. HEENT: PERR, EOMI, MMM. Cardiovascular: Normal rate and regular rhythm. Pulmonary/Chest: Effort normal and breath sounds normal.   Abdomen: Soft, + BS, NT. Assessment/Plan:  Marilee Griffin is a(n) 66y.o. year old male with MDD. 1.  Alcohol abuse. Patient started on thiamine, folic acid and multivitamin. 2.  GERD. Continue Protonix 40 mg daily. 3.  Hyperlipidemia. Lifestyle modifications. 4.  DJD/OA. Ibuprofen as needed. 5.  Hemorrhoids. Apply Anusol cream as needed. 6.  Vitamin D deficiency. Patient started on vitamin D2 50,000 units weekly for 6 weeks followed by vitamin D3 1000 units daily. The patient was discussed with Dr. Damien Rivera and he is in agreement with the above note.

## 2023-11-02 NOTE — NURSING NOTE
Patient noted to be visible on the milieu and social w/ select peers. Denied SI/HI/AVH and depression. Endorsed mild to moderate anxiety during interaction. Refused hs medications. Patient reports experiencing side effects from gabapentin. Patient stated " why am I getting that medication again? It made me feel really lightheaded and loopy so I will skip that and talk with the doctor in the morning." Patient also refused hs Remeron reporting having no issues w/ sleep and does not want to "take all these different pills." Offers no further complaints. Q 7 min safety checks continuous and maintained.

## 2023-11-02 NOTE — PLAN OF CARE
Problem: Ineffective Coping  Goal: Cooperates with admission process  Description: Interventions:   - Complete admission process  Outcome: Progressing  Goal: Participates in unit activities  Description: Interventions:  - Provide therapeutic environment   - Provide required programming   - Redirect inappropriate behaviors   Outcome: Progressing

## 2023-11-03 LAB
ALBUMIN SERPL BCP-MCNC: 3.7 G/DL (ref 3.5–5)
ALP SERPL-CCNC: 73 U/L (ref 34–104)
ALT SERPL W P-5'-P-CCNC: 26 U/L (ref 7–52)
ANION GAP SERPL CALCULATED.3IONS-SCNC: 7 MMOL/L
AST SERPL W P-5'-P-CCNC: 25 U/L (ref 13–39)
BILIRUB SERPL-MCNC: 0.66 MG/DL (ref 0.2–1)
BUN SERPL-MCNC: 13 MG/DL (ref 5–25)
CALCIUM SERPL-MCNC: 8.8 MG/DL (ref 8.4–10.2)
CHLORIDE SERPL-SCNC: 105 MMOL/L (ref 96–108)
CO2 SERPL-SCNC: 25 MMOL/L (ref 21–32)
CREAT SERPL-MCNC: 0.75 MG/DL (ref 0.6–1.3)
GFR SERPL CREATININE-BSD FRML MDRD: 99 ML/MIN/1.73SQ M
GLUCOSE P FAST SERPL-MCNC: 110 MG/DL (ref 65–99)
GLUCOSE SERPL-MCNC: 110 MG/DL (ref 65–140)
POTASSIUM SERPL-SCNC: 3.9 MMOL/L (ref 3.5–5.3)
PROT SERPL-MCNC: 6.6 G/DL (ref 6.4–8.4)
SODIUM SERPL-SCNC: 137 MMOL/L (ref 135–147)

## 2023-11-03 PROCEDURE — 80053 COMPREHEN METABOLIC PANEL: CPT

## 2023-11-03 PROCEDURE — 99232 SBSQ HOSP IP/OBS MODERATE 35: CPT | Performed by: PSYCHIATRY & NEUROLOGY

## 2023-11-03 RX ORDER — LORAZEPAM 0.5 MG/1
0.5 TABLET ORAL EVERY 6 HOURS PRN
Status: DISCONTINUED | OUTPATIENT
Start: 2023-11-03 | End: 2023-11-03

## 2023-11-03 RX ORDER — LORAZEPAM 0.5 MG/1
0.5 TABLET ORAL EVERY 6 HOURS PRN
Status: DISCONTINUED | OUTPATIENT
Start: 2023-11-03 | End: 2023-11-06

## 2023-11-03 RX ORDER — HYDROXYZINE 50 MG/1
100 TABLET, FILM COATED ORAL
Status: DISCONTINUED | OUTPATIENT
Start: 2023-11-03 | End: 2023-11-07 | Stop reason: HOSPADM

## 2023-11-03 RX ORDER — LORAZEPAM 0.5 MG/1
0.5 TABLET ORAL EVERY 8 HOURS PRN
Status: DISCONTINUED | OUTPATIENT
Start: 2023-11-03 | End: 2023-11-03

## 2023-11-03 RX ORDER — HYDROXYZINE 50 MG/1
50 TABLET, FILM COATED ORAL
Status: DISCONTINUED | OUTPATIENT
Start: 2023-11-03 | End: 2023-11-07 | Stop reason: HOSPADM

## 2023-11-03 RX ADMIN — Medication 1 TABLET: at 08:42

## 2023-11-03 RX ADMIN — GABAPENTIN 100 MG: 100 CAPSULE ORAL at 08:42

## 2023-11-03 RX ADMIN — GABAPENTIN 100 MG: 100 CAPSULE ORAL at 16:55

## 2023-11-03 RX ADMIN — THIAMINE HCL TAB 100 MG 100 MG: 100 TAB at 08:42

## 2023-11-03 RX ADMIN — IBUPROFEN 600 MG: 600 TABLET, FILM COATED ORAL at 09:15

## 2023-11-03 RX ADMIN — FOLIC ACID 1 MG: 1 TABLET ORAL at 08:42

## 2023-11-03 RX ADMIN — LORAZEPAM 0.5 MG: 0.5 TABLET ORAL at 11:38

## 2023-11-03 RX ADMIN — MIRTAZAPINE 15 MG: 15 TABLET, FILM COATED ORAL at 23:39

## 2023-11-03 RX ADMIN — ERGOCALCIFEROL 50000 UNITS: 1.25 CAPSULE, LIQUID FILLED ORAL at 08:41

## 2023-11-03 RX ADMIN — LORAZEPAM 0.5 MG: 0.5 TABLET ORAL at 22:09

## 2023-11-03 NOTE — PLAN OF CARE
Problem: Ineffective Coping  Goal: Participates in unit activities  Description: Interventions:  - Provide therapeutic environment   - Provide required programming   - Redirect inappropriate behaviors   Outcome: Progressing     Problem: Depression  Goal: Verbalize thoughts and feelings  Description: Interventions:  - Assess and re-assess patient's level of risk   - Engage patient in 1:1 interactions, daily, for a minimum of 15 minutes   - Encourage patient to express feelings, fears, frustrations, hopes   Outcome: Progressing

## 2023-11-03 NOTE — PROGRESS NOTES
11/03/23   Team Meeting   Meeting Type Daily Rounds   Team Members Present   Team Members Present Physician;Nurse;Occupational Therapist;   Physician Team Member Dr. Feliciano Eaton MD   Nursing Team Member RN   Social Work Team Member [address] [place] [address] Team Member Freire Means [employer]   Patient/Family Present   Patient Present No   Patient's Family Present No     Pt isolative to self. Pt refused rehab. Bloodwork drawn am. Pt slept last pm. Motrin given for neck pain. Ativan last given yest am. Pt stated anx and dep 'not too bad' in am. Denied all else.

## 2023-11-03 NOTE — PROGRESS NOTES
Progress Note - Behavioral Health     Arturo Julian 66 y.o. male MRN: [id number]   Unit/Bed#: Jerardo Mera 203-02 Encounter: [id number]    Behavior over the last 24 hours: minimal improvement. Mtat Nelson ws seen for continuing care. He states that when he did not take Gabapentin last night because he got dizzy. Also refused Revia this am and believes he does not need it. He reports not taking prn Ativan since yesterday morning and verbalizes it's need to be tapered. He states that he is not currently depressed but anxious. He denies any current SI/HI/AVH but shows limited insight into his alcohol use disorder. . Staff report he is cooperative in groups and on the unit. Sleep:  imporving  Appetite: normal  Medication side effects: mild dizziness last night   ROS: all other systems are negative    Mental Status Evaluation:    Appearance:  dressed in hospital attire, overweight   Behavior:  cooperative, calm   Speech:  normal rate, normal volume   Mood:  "anxious"   Affect:  constricted   Thought Process:  goal directed, linear   Associations: intact associations   Thought Content:  no overt delusions   Perceptual Disturbances: no auditory hallucinations, no visual hallucinations   Risk Potential: Suicidal ideation - None at present  Homicidal ideation - None   Sensorium:  oriented to person, place, and time/date   Memory:  recent and remote memory grossly intact   Consciousness:  alert and awake   Attention: attention span and concentration are age appropriate   Insight:  poor   Judgment: limited   Gait/Station: normal gait/station   Motor Activity: no abnormal movements     Vital signs in last 24 hours:    Temp:  [97.3 °F (36.3 °C)-98.8 °F (37.1 °C)] 97.3 °F (36.3 °C)  HR:  [66-77] 74  Resp:  [18] 18  BP: (125-134)/(71-79) 134/77    Laboratory results: I have personally reviewed all pertinent laboratory/tests results.   Most Recent Labs:   Lab Results   Component Value Date    WBC 6.61 10/28/2023    RBC 4.04 10/28/2023 HGB 13.8 10/28/2023    HCT 41.5 10/28/2023     10/28/2023    RDW 12.7 10/28/2023    NEUTROABS 3.40 10/28/2023    SODIUM 137 11/03/2023    K 3.9 11/03/2023     11/03/2023    CO2 25 11/03/2023    BUN 13 11/03/2023    CREATININE 0.75 11/03/2023    GLUC 110 11/03/2023    GLUF 110 (H) 11/03/2023    CALCIUM 8.8 11/03/2023    AST 25 11/03/2023    ALT 26 11/03/2023    ALKPHOS 73 11/03/2023    TP 6.6 11/03/2023    ALB 3.7 11/03/2023    TBILI 0.66 11/03/2023    CHOLESTEROL 206 (H) 11/01/2023    HDL 62 11/01/2023    TRIG 119 11/01/2023    LDLCALC 120 (H) 11/01/2023    [address] 11/01/2023    [id number] 2.505 10/28/2023       Assessment/Plan   Principal Problem:    MDD (major depressive disorder), recurrent episode, severe (HCC)  Active Problems:    Alcohol abuse    GERD (gastroesophageal reflux disease)    DJD (degenerative joint disease)    Recommended Treatment:     Planned medication and treatment changes: All current active medications have been reviewed  Encourage group therapy, milieu therapy and occupational therapy  Behavioral Health checks every 7 minutes  Reduced Ativan to 0.5 mg prn with plan to taper off gradually. D/C WA protocol. Pt decline rehab at this time.        Current Facility-Administered Medications   Medication Dose Route Frequency Provider Last Rate    aluminum-magnesium hydroxide-simethicone  30 mL Oral Q4H PRN Jeff Castro PA-C      benztropine  1 mg Intramuscular Q4H PRN Max 6/day Latrice Masters PA-C      benztropine  0.5 mg Oral Q4H PRN Max 6/day Latrice Masters PA-C      bisacodyl  10 mg Rectal Daily PRN Alethea Jj MD      [START ON 12/9/2023] cholecalciferol  1,000 Units Oral Daily Nay Randolph PA-C      ergocalciferol  50,000 Units Oral Weekly Nay Randolph PA-C      folic acid  1 mg Oral Daily Latrice Cervantes PA-C      gabapentin  100 mg Oral TID Alethea Jj MD      glycerin-hypromellose-  1 drop Both Eyes Q3H PRN Esteban Varela Marcelo Pin, PA-C      hydrocortisone   Topical 4x Daily PRN Dalton Roy MD      hydrOXYzine HCL  25 mg Oral Q6H PRN Max 4/day [facility], PA-C      hydrOXYzine HCL  50 mg Oral Q6H PRN Max 4/day [place], [place]      ibuprofen  200 mg Oral Q6H PRN Kaitlin Galan Stives, PA-C      ibuprofen  400 mg Oral Q6H PRN Kaitlin Kling Stives, PA-C      ibuprofen  600 mg Oral Q8H PRN Kaitlin Angelia Stives, PA-C      LORazepam  1 mg Intramuscular Q6H PRN Max 3/day Kaitlin Galan Stives, PA-C      LORazepam  1 mg Oral Q8H PRN Kaitlin Cervantes, PA-C      mirtazapine  15 mg Oral HS Soledad Salazar MD      multivitamin-minerals  1 tablet Oral Daily [place], [place]      nicotine polacrilex  4 mg Oral Q2H PRN Kaitlin Cervantes, PA-C      OLANZapine  5 mg Intramuscular Q3H PRN Max 3/day Kaitlin Cervantes, PA-C      OLANZapine  2.5 mg Oral Q4H PRN Max 6/day [facility], PA-C      OLANZapine  5 mg Oral Q4H PRN Max 3/day Kaitlin Cervantes, PA-C      OLANZapine  5 mg Oral Q3H PRN Max 3/day Kaitlin Galan Stiarsen, PA-C      pantoprazole  40 mg Oral Early Morning Kaitlin Cervantes, PA-C      polyethylene glycol  17 g Oral Daily PRN Soledad Salazar MD      propranolol  5 mg Oral Q8H PRN Kristie Jolley, PA-C      senna-docusate sodium  1 tablet Oral Daily PRN Kaitlin Cervantes, PA-C      thiamine  100 mg Oral Daily [facility], PA-C      traZODone  50 mg Oral HS PRN Kaitlin Cervantes, PA-C      traZODone  50 mg Oral Q6H PRN Max 3/day Kaitlin Cervantes, PA-C         Risks / Benefits of Treatment:    Risks, benefits, and possible side effects of medications explained to patient and patient verbalizes understanding and agreement for treatment. Counseling / Coordination of Care:    Patient's progress discussed with staff in treatment team meeting. Medications, treatment progress and treatment plan reviewed with patient. Supportive therapy provided to patient.   Group attendance encouraged.     Florence Baker MD 11/03/23

## 2023-11-03 NOTE — PROGRESS NOTES
11/02/23   Team Meeting   Meeting Type Daily Rounds   Team Members Present   Team Members Present Physician;Nurse;Occupational Therapist;   Physician Team Member Dr. Luis Daniel Lindsey MD; Marylu Ibrahim PA-C   Nursing Team Member Shravan Royal RN   Social Work Team Member Sana MCKEON   OT Team Member Feliciano Ray CTRS   Patient/Family Present   Patient Present No   Patient's Family Present No     Pt rep mild to mod anx. Ref gabapentin yest. Hs ref remeron. Selective with meds. No ativan given on night shift.  Pt slept last pm.

## 2023-11-03 NOTE — PROGRESS NOTES
11/01/23   Team Meeting   Meeting Type Daily Rounds   Team Members Present   Team Members Present Physician;Nurse;Occupational Therapist;   Physician Team Member Dr. Yenifer Yin MD; Lyla Ho PA-C   Nursing Team Member Soumya Barton RN   Social Work Team Member [address] [place] [address]   OT Team Member German Matt CTRS   Patient/Family Present   Patient Present No   Patient's Family Present No     Pt 201 admission. S/I prior to admission. 2 liter wine daily consumption. Financial stressors. Pt denied all am, guarded, minimizes.  Zyprexa effective for sleep last pm.

## 2023-11-03 NOTE — NURSING NOTE
Patient noted to be visible on the milieu and social w/ select peers. Denied SI/HI/AVH and depression. 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 Refused hs medications. Patient continues to report experiencing side effects from gabapentin. Patient also refused hs Remeron reporting having no issues w/ sleep nor depression. Offers no further complaints at this time. Q 7 min safety checks continuous and maintained.

## 2023-11-03 NOTE — NURSING NOTE
Administered 0.5 mg ativan at 11:39 am for withdrawal symptoms. CIWA 3. Patient stated ativan was effective and is sitting in dining room watching TV. Continue to monitor.

## 2023-11-03 NOTE — NURSING NOTE
Patient pleasant and cooperative. Visible on the unit throughout the day. Medication compliant. He refused the revia this morning. Denied depression. Anxiety is not too bad. Denied SI,HI, or hallucinations. C/o neck pain earlier this morning. Motrin administered for #7 neck pain earlier today was effective. Q 7 minute safety checks maintained.

## 2023-11-03 NOTE — PROGRESS NOTES
Progress Note - Mehul Reed 66 y.o. male MRN: [id number]    Unit/Bed#: Quinton Jerez 538-25 Encounter: [id number]        Subjective:   Patient seen and examined at bedside after reviewing the chart and discussing the case with the caring staff. Patient examined at bedside. Patient has no acute complaints. Physical Exam   Vitals: Blood pressure 134/77, pulse 74, temperature (!) 97.3 °F (36.3 °C), temperature source Temporal, resp. rate 18, height 5' 8" (1.727 m), weight 102 kg (225 lb 12.8 oz), SpO2 97 %. ,Body mass index is 34.33 kg/m². Constitutional: Patient in no acute distress. HEENT: PERR, EOMI, MMM. Cardiovascular: Normal rate and regular rhythm. Pulmonary/Chest: Effort normal and breath sounds normal.   Abdomen: Soft, + BS, NT. Assessment/Plan:  Mehul Reed is a(n) 66y.o. year old male with MDD. 1.  Alcohol abuse. Patient started on thiamine, folic acid and multivitamin. 2.  GERD. Continue Protonix 40 mg daily. 3.  Hyperlipidemia. Lifestyle modifications. 4.  DJD/OA. Ibuprofen as needed. 5.  Hemorrhoids. Apply Anusol cream as needed. 6.  Vitamin D deficiency. Patient started on vitamin D2 50,000 units weekly for 6 weeks followed by vitamin D3 1000 units daily. The patient was discussed with Dr. Akira Robertson and he is in agreement with the above note.

## 2023-11-04 PROCEDURE — 99232 SBSQ HOSP IP/OBS MODERATE 35: CPT

## 2023-11-04 RX ADMIN — THIAMINE HCL TAB 100 MG 100 MG: 100 TAB at 08:29

## 2023-11-04 RX ADMIN — GABAPENTIN 100 MG: 100 CAPSULE ORAL at 17:14

## 2023-11-04 RX ADMIN — LORAZEPAM 0.5 MG: 0.5 TABLET ORAL at 21:32

## 2023-11-04 RX ADMIN — LORAZEPAM 0.5 MG: 0.5 TABLET ORAL at 09:31

## 2023-11-04 RX ADMIN — IBUPROFEN 400 MG: 400 TABLET ORAL at 09:31

## 2023-11-04 RX ADMIN — MIRTAZAPINE 15 MG: 15 TABLET, FILM COATED ORAL at 21:32

## 2023-11-04 RX ADMIN — GABAPENTIN 100 MG: 100 CAPSULE ORAL at 08:28

## 2023-11-04 RX ADMIN — FOLIC ACID 1 MG: 1 TABLET ORAL at 08:29

## 2023-11-04 RX ADMIN — Medication 1 TABLET: at 08:28

## 2023-11-04 NOTE — NURSING NOTE
Patient seen at the  community and social. Complain anxiety in the morning and ativan given was effective. He is calm and stable. No disturbed behavior noted. Compliant with morning and evening medications. No complaints of S/I,H/I and AH. Denies any pain at present. Positive for evening snacks. Maintain on q7 min safety checks. Will continue to monitor.

## 2023-11-04 NOTE — NURSING NOTE
@9350 Pt complain of feeling anxiety and irritable. Advised to use his coping skills but requested his medication. Ativan 0.5 mg po prn given. To continue q7 mins safety checks and monitor.

## 2023-11-04 NOTE — NURSING NOTE
Patient visible on the unit. Pleasant and cooperative. Social with peers. Denies SI, HI, and hallucinations. Endorses moderate anxiety and depression, because today was his wife's  birthday and he wasn't home. Requested Ativan for anxiety. Refused Neurontin Will continue to monitor and access. Continuous rounding maintained.

## 2023-11-04 NOTE — PROGRESS NOTES
Progress Note - Behavioral Health   Rayvon Lanes 66 y.o. male MRN: [id number]  Unit/Bed#: Barby Helm 203-02 Encounter: [id number]    Assessment/Plan   Principal Problem:    MDD (major depressive disorder), recurrent episode, severe ([address])  Active Problems:    Alcohol abuse    GERD (gastroesophageal reflux disease)    DJD (degenerative joint disease)      Subjective:Patient was seen today for continuation of care, records reviewed and  patient was discussed with the morning case review team.  No behavioral changes over the past 24 hours, endorses mild depression and anxiety from being in the hospital.  Endorses agreement with recent decrease in lorazepam to 0.5 mg as needed only with goal to utilize for severe anxiety only. Discharge planning ongoing to rehab. Recently  initiated Remeron 15 mg at bedtime  aiding with sleep onset, hopeful to see more improvement in anxiety control going forward. Patient denies endorsing any suicidal or homicidal ideation. Remains medication compliance. Denies any side effects from medications.     Psychiatric Review of Systems:    Sleep: improved  Appetite: normal  Medication side effects: No   ROS: all other systems are negative    Vitals:  Vitals:    11/04/23 0749   BP: 136/73   Pulse: 77   Resp: 18   Temp: 97.5 °F (36.4 °C)   SpO2: 98%       Mental Status Evaluation:    Appearance:  age appropriate, casually dressed, dressed appropriately, adequate grooming   Behavior:  pleasant, cooperative, calm   Speech:  normal rate and volume   Mood:  anxious   Affect:  constricted   Thought Process:  coherent, goal directed   Associations: intact associations   Thought Content:  no overt delusions   Perceptual Disturbances: no auditory hallucinations, no visual hallucinations   Risk Potential: Suicidal ideation - None at present  Homicidal ideation - None at present  Potential for aggression - No   Sensorium:  oriented to person, place, and time/date   Memory:  recent and remote memory grossly intact   Consciousness:  alert and awake   Attention: attention span and concentration are age appropriate   Insight:  limited   Judgment: limited   Gait/Station: normal gait/station   Motor Activity: no abnormal movements     Laboratory results:  I have personally reviewed all pertinent laboratory/tests results.   Most Recent Labs:   Lab Results   Component Value Date    WBC 6.61 10/28/2023    RBC 4.04 10/28/2023    HGB 13.8 10/28/2023    HCT 41.5 10/28/2023     10/28/2023    RDW 12.7 10/28/2023    NEUTROABS 3.40 10/28/2023    SODIUM 137 11/03/2023    K 3.9 11/03/2023     11/03/2023    CO2 25 11/03/2023    BUN 13 11/03/2023    CREATININE 0.75 11/03/2023    GLUC 110 11/03/2023    GLUF 110 (H) 11/03/2023    CALCIUM 8.8 11/03/2023    AST 25 11/03/2023    ALT 26 11/03/2023    ALKPHOS 73 11/03/2023    TP 6.6 11/03/2023    ALB 3.7 11/03/2023    TBILI 0.66 11/03/2023    CHOLESTEROL 206 (H) 11/01/2023    HDL 62 11/01/2023    TRIG 119 11/01/2023    LDLCALC 120 (H) 11/01/2023    [address] [facility] 144 11/01/2023    [id number] 2.505 10/28/2023           Recommended Treatment:     Continue Remeron 15 mg at bedtime    All current active medications have been reviewed  Encourage group therapy, milieu therapy and occupational therapy  Continue treatment with group therapy, milieu therapy and occupational therapy  Behavioral Health checks every 7 minutes  Continue current medications:  Current Facility-Administered Medications   Medication Dose Route Frequency Provider Last Rate    aluminum-magnesium hydroxide-simethicone  30 mL Oral Q4H PRN Mary Saavedra PA-C      benztropine  1 mg Intramuscular Q4H PRN Max 6/day Yann Masters PA-C      benztropine  0.5 mg Oral Q4H PRN Max 6/day Yann Masters PA-C      bisacodyl  10 mg Rectal Daily PRN Eelni Gonzalez MD      [START ON 12/9/2023] cholecalciferol  1,000 Units Oral Daily Nay Randolph PA-C      ergocalciferol  50,000 Units Oral Weekly Nay SAUL Agustín, PA-C      folic acid  1 mg Oral Daily [facility], PA-C      gabapentin  100 mg Oral TID Prem Mackenzie MD      glycerin-hypromellose-  1 drop Both Eyes Q3H PRN Justin Mooreves, PA-C      hydrocortisone   Topical 4x Daily PRN Jewell Polanco MD      hydrOXYzine HCL  100 mg Oral Q6H PRN Max 4/day Prem Mackenzie MD      hydrOXYzine HCL  50 mg Oral Q6H PRN Max 4/day Prem Mackenzie MD      ibuprofen  200 mg Oral Q6H PRN Justin Cano Stives, PA-C      ibuprofen  400 mg Oral Q6H PRN Justin Cano Stives, PA-C      ibuprofen  600 mg Oral Q8H PRN Justin Mooreves, PA-C      LORazepam  0.5 mg Oral Q6H PRN Prem Mackenzie MD      mirtazapine  15 mg Oral HS Prem Mackenzie MD      multivitamin-minerals  1 tablet Oral Daily [place], [place]      nicotine polacrilex  4 mg Oral Q2H PRN Justin Cano Stives, PA-C      OLANZapine  5 mg Intramuscular Q3H PRN Max 3/day Justin Cano Stives, PA-C      OLANZapine  2.5 mg Oral Q4H PRN Max 6/day [facility], PA-C      OLANZapine  5 mg Oral Q4H PRN Max 3/day Justin Cano Stives, PA-C      OLANZapine  5 mg Oral Q3H PRN Max 3/day Justin Cano Stives, PA-C      pantoprazole  40 mg Oral Early Morning Justin Cano Stives, PA-C      polyethylene glycol  17 g Oral Daily PRN Prem Mackenzie MD      propranolol  5 mg Oral Q8H PRN Kayleigh Pratt, PA-C      senna-docusate sodium  1 tablet Oral Daily PRN Justin Cano Stives, PA-C      thiamine  100 mg Oral Daily [facility], PA-C      traZODone  50 mg Oral HS PRN Justin Cano Stives, PA-C      traZODone  50 mg Oral Q6H PRN Max 3/day Justin Cano Stives, PA-C         Risks / Benefits of Treatment:     Risks, benefits, and possible side effects of medications explained to patient. Patient has limited understanding of risks and benefits of treatment at this time, but agrees to take medications as prescribed. Counseling / Coordination of Care:     Patient's progress reviewed with nursing staff.   Medications, treatment progress and treatment plan reviewed with patient. Supportive counseling provided to the patient.           FRANKY Gomez Instructions: This plan will send the code FBSE to the PM system.  DO NOT or CHANGE the price. Detail Level: Simple Price (Do Not Change): 0.00

## 2023-11-04 NOTE — PROGRESS NOTES
Progress Note - Cora Herman 66 y.o. male MRN: [id number]    Unit/Bed#: Mahesh Alan 036-25 Encounter: [id number]        Subjective:   Patient seen and examined at bedside after reviewing the chart and discussing the case with the caring staff. Patient examined at bedside. Patient was noticed by the caring staff to have a rash on her right leg/foot. On examination patient thinks that his rash was secondary to detergent allergy. The rash is getting better. Physical Exam   Vitals: Blood pressure 136/73, pulse 77, temperature 97.5 °F (36.4 °C), temperature source Temporal, resp. rate 18, height 5' 8" (1.727 m), weight 102 kg (225 lb 12.8 oz), SpO2 98 %. ,Body mass index is 34.33 kg/m². Constitutional: Patient in no acute distress. HEENT: PERR, EOMI, MMM. Cardiovascular: Normal rate and regular rhythm. Pulmonary/Chest: Effort normal and breath sounds normal.   Abdomen: Soft, + BS, NT. Assessment/Plan:  Cora Herman is a(n) 66y.o. year old male with MDD. 1.  Alcohol abuse. Patient started on thiamine, folic acid and multivitamin. 2.  GERD. Continue Protonix 40 mg daily. 3.  Hyperlipidemia. Lifestyle modifications. 4.  DJD/OA. Ibuprofen as needed. 5.  Hemorrhoids. Apply Anusol cream as needed. 6.  Vitamin D deficiency. Patient started on vitamin D2 50,000 units weekly for 6 weeks followed by vitamin D3 1000 units daily.

## 2023-11-04 NOTE — PLAN OF CARE
Problem: Ineffective Coping  Goal: Cooperates with admission process  Description: Interventions:   - Complete admission process  Outcome: Completed     Problem: Depression  Goal: Refrain from harming self  Description: Interventions:  - Monitor patient closely, per order   - Supervise medication ingestion, monitor effects and side effects   Outcome: Progressing  Goal: Refrain from isolation  Description: Interventions:  - Develop a trusting relationship   - Encourage socialization   Outcome: Progressing

## 2023-11-05 PROCEDURE — 99232 SBSQ HOSP IP/OBS MODERATE 35: CPT

## 2023-11-05 RX ADMIN — FOLIC ACID 1 MG: 1 TABLET ORAL at 09:07

## 2023-11-05 RX ADMIN — TRAZODONE HYDROCHLORIDE 25 MG: 50 TABLET ORAL at 01:54

## 2023-11-05 RX ADMIN — THIAMINE HCL TAB 100 MG 100 MG: 100 TAB at 09:07

## 2023-11-05 RX ADMIN — IBUPROFEN 600 MG: 600 TABLET, FILM COATED ORAL at 20:05

## 2023-11-05 RX ADMIN — MIRTAZAPINE 15 MG: 15 TABLET, FILM COATED ORAL at 21:59

## 2023-11-05 RX ADMIN — HYDROXYZINE HYDROCHLORIDE 50 MG: 50 TABLET, FILM COATED ORAL at 20:06

## 2023-11-05 RX ADMIN — GABAPENTIN 100 MG: 100 CAPSULE ORAL at 09:07

## 2023-11-05 RX ADMIN — GABAPENTIN 100 MG: 100 CAPSULE ORAL at 16:55

## 2023-11-05 RX ADMIN — TRAZODONE HYDROCHLORIDE 50 MG: 50 TABLET ORAL at 21:59

## 2023-11-05 RX ADMIN — Medication 1 TABLET: at 09:07

## 2023-11-05 NOTE — PROGRESS NOTES
Progress Note - Behavioral Health   Zia Collins 66 y.o. male MRN: [id number]  Unit/Bed#: Aguilar Dexter 203-02 Encounter: [id number]    Assessment/Plan   Principal Problem:    MDD (major depressive disorder), recurrent episode, severe ([address])  Active Problems:    Alcohol abuse    GERD (gastroesophageal reflux disease)    DJD (degenerative joint disease)      Subjective:Patient was seen today for continuation of care, records reviewed and  patient was discussed with the morning case review team.  Staff reports no significant changes over the past performance, mild difficulty falling asleep, received as needed trazodone with effect. Continue to encourage the use of coping skills before utilizing as needed lorazepam 0.5 mg, received 2 doses of as needed lorazepam in the past 24 hours. Endorses moderate anxiety regarding paying bills and keeping up with his responsibilities outside the hospital.  Discharge planning ongoing in terms of finding a potential rehab. Patient denies endorsing any suicidal or homicidal ideation. Remains selective with medication compliance. Denies any side effects from medications.     Psychiatric Review of Systems:    Sleep: improved  Appetite: normal  Medication side effects: No   ROS: all other systems are negative    Vitals:  Vitals:    11/05/23 0710   BP: 118/68   Pulse: 77   Resp: 16   Temp: 97.7 °F (36.5 °C)   SpO2: 98%       Mental Status Evaluation:    Appearance:  age appropriate, casually dressed, dressed appropriately, adequate grooming   Behavior:  pleasant, cooperative, calm   Speech:  normal rate and volume   Mood:  depressed   Affect:  constricted   Thought Process:  coherent, goal directed   Associations: intact associations   Thought Content:  no overt delusions   Perceptual Disturbances: no auditory hallucinations, no visual hallucinations   Risk Potential: Suicidal ideation - None at present  Homicidal ideation - None at present  Potential for aggression - No   Sensorium: oriented to person, place, and time/date   Memory:  recent and remote memory grossly intact   Consciousness:  alert and awake   Attention: attention span and concentration are age appropriate   Insight:  limited   Judgment: limited   Gait/Station: normal gait/station   Motor Activity: no abnormal movements     Laboratory results:  I have personally reviewed all pertinent laboratory/tests results. Most Recent Labs:   Lab Results   Component Value Date    WBC 6.61 10/28/2023    RBC 4.04 10/28/2023    HGB 13.8 10/28/2023    HCT 41.5 10/28/2023     10/28/2023    RDW 12.7 10/28/2023    NEUTROABS 3.40 10/28/2023    SODIUM 137 11/03/2023    K 3.9 11/03/2023     11/03/2023    CO2 25 11/03/2023    BUN 13 11/03/2023    CREATININE 0.75 11/03/2023    GLUC 110 11/03/2023    GLUF 110 (H) 11/03/2023    CALCIUM 8.8 11/03/2023    AST 25 11/03/2023    ALT 26 11/03/2023    ALKPHOS 73 11/03/2023    TP 6.6 11/03/2023    ALB 3.7 11/03/2023    TBILI 0.66 11/03/2023    CHOLESTEROL 206 (H) 11/01/2023    HDL 62 11/01/2023    TRIG 119 11/01/2023    LDLCALC 120 (H) 11/01/2023    NONHDLC 144 11/01/2023    [id number] 2.505 10/28/2023           Recommended Treatment:     Continue Remeron 15 mg at bedtime, tolerating with no adverse effects.      All current active medications have been reviewed  Encourage group therapy, milieu therapy and occupational therapy  Continue treatment with group therapy, milieu therapy and occupational therapy  Behavioral Health checks every 7 minutes  Continue current medications:  Current Facility-Administered Medications   Medication Dose Route Frequency Provider Last Rate    aluminum-magnesium hydroxide-simethicone  30 mL Oral Q4H PRN Liz Sahni PA-C      benztropine  1 mg Intramuscular Q4H PRN Max 6/day Eddie Iron Govind Masters PA-C      benztropine  0.5 mg Oral Q4H PRN Max 6/day Eddie Iron [place], PA-C      bisacodyl  10 mg Rectal Daily PRN Katerina Zafar MD      [START ON 12/9/2023] cholecalciferol  1,000 Units Oral Daily Nay L Dagnall, PA-C      ergocalciferol  50,000 Units Oral Weekly Nay L Dagnall, PA-C      folic acid  1 mg Oral Daily Natasha Helm Stives, PA-C      gabapentin  100 mg Oral TID Olga Lidia Bowels, MD      glycerin-hypromellose-  1 drop Both Eyes Q3H PRN Natasha Helm Stives, PA-C      hydrocortisone   Topical 4x Daily PRN Liz Deal, MD      hydrOXYzine HCL  100 mg Oral Q6H PRN Max 4/day Olga Lidia Bowels, MD      hydrOXYzine HCL  50 mg Oral Q6H PRN Max 4/day Olga Lidia Bowels, MD      ibuprofen  200 mg Oral Q6H PRN Natasha Helm Stives, PA-C      ibuprofen  400 mg Oral Q6H PRN Natasha Helm Stives, PA-C      ibuprofen  600 mg Oral Q8H PRN Natasha Helm Stives, PAQuinnC      LORazepam  0.5 mg Oral Q6H PRN Olga Lidia Bowels, MD      mirtazapine  15 mg Oral HS Olga Lidia Bowels, MD      multivitamin-minerals  1 tablet Oral Daily [place]      nicotine polacrilex  4 mg Oral Q2H PRN Natasha Helm Stives, PA-C      OLANZapine  5 mg Intramuscular Q3H PRN Max 3/day Natasha Helm Stives, PA-C      OLANZapine  2.5 mg Oral Q4H PRN Max 6/[facility]      OLANZapine  5 mg Oral Q4H PRN Max 3/day Natasha Helm Stives, PA-C      OLANZapine  5 mg Oral Q3H PRN Max 3/day Natasha Helm Stives, PA-C      pantoprazole  40 mg Oral Early Morning Natasha Helm Stives, NAZIA      polyethylene glycol  17 g Oral Daily PRN Olga Lidia Bowels, MD      propranolol  5 mg Oral Q8H PRN Odean Age, PA-C      senna-docusate sodium  1 tablet Oral Daily PRN Natasha Helm Stives, PA-C      thiamine  100 mg Oral Daily [facility], PA-C      traZODone  50 mg Oral HS PRN Natasha Helm Stives, PA-C      traZODone  50 mg Oral Q6H PRN Max 3/day Natasha Helm Stives, PA-C         Risks / Benefits of Treatment:     Risks, benefits, and possible side effects of medications explained to patient.  Patient has limited understanding of risks and benefits of treatment at this time, but agrees to take medications as prescribed. Counseling / Coordination of Care:     Patient's progress reviewed with nursing staff. Medications, treatment progress and treatment plan reviewed with patient. Supportive counseling provided to the patient.           FRANKY Huang

## 2023-11-05 NOTE — NURSING NOTE
Patient is seen at the community milieu,interactive and social. He is calm and stable. No disturbed behavior noted. Compliant morning and with evening medications. No complaints of S/I,H/I and AH. Denies any pain at present. Positive for evening snacks. Maintain on q7 min safety checks. Will continue to monitor.

## 2023-11-05 NOTE — NURSING NOTE
Patient had some difficulty falling asleep and staying asleep. No signs or symptoms of distress. Q 7 minute safety checks maintained.

## 2023-11-05 NOTE — PROGRESS NOTES
Progress Note - Rocael Sarkar 66 y.o. male MRN: [id number]    Unit/Bed#: Karlo Art 204-29 Encounter: [id number]        Subjective:   Patient seen and examined at bedside after reviewing the chart and discussing the case with the caring staff. Patient examined at bedside. Patient is requesting something for his hemorrhoids which are bothering him. Physical Exam   Vitals: Blood pressure 118/68, pulse 77, temperature 97.7 °F (36.5 °C), temperature source Temporal, resp. rate 16, height 5' 8" (1.727 m), weight 102 kg (225 lb 12.8 oz), SpO2 98 %. ,Body mass index is 34.33 kg/m². Constitutional: Patient in no acute distress. HEENT: PERR, EOMI, MMM. Cardiovascular: Normal rate and regular rhythm. Pulmonary/Chest: Effort normal and breath sounds normal.   Abdomen: Soft, + BS, NT. Assessment/Plan:  Rocael Sarkar is a(n) 66y.o. year old male with MDD. 1.  Alcohol abuse. Patient started on thiamine, folic acid and multivitamin. 2.  GERD. Continue Protonix 40 mg daily. 3.  Hyperlipidemia. Lifestyle modifications. 4.  DJD/OA. Ibuprofen as needed. 5.  Hemorrhoids. Apply Anusol cream as needed. 6.  Vitamin D deficiency. Patient started on vitamin D2 50,000 units weekly for 6 weeks followed by vitamin D3 1000 units daily.

## 2023-11-05 NOTE — NURSING NOTE
Patient observed on the unit playing cards with peers. Pleasant and cooperative. Participated in snack. Requested and received Ativan  0.5 mg for severe anxiety with good results. Refused evening Neurontin. Denies SI, HI, and hallucinations. Continuous rounding maintained.

## 2023-11-06 PROCEDURE — 99232 SBSQ HOSP IP/OBS MODERATE 35: CPT | Performed by: PSYCHIATRY & NEUROLOGY

## 2023-11-06 RX ORDER — GABAPENTIN 100 MG/1
100 CAPSULE ORAL 3 TIMES DAILY
Qty: 90 CAPSULE | Refills: 1 | Status: SHIPPED | OUTPATIENT
Start: 2023-11-06

## 2023-11-06 RX ORDER — MIRTAZAPINE 15 MG/1
15 TABLET, FILM COATED ORAL
Qty: 30 TABLET | Refills: 1 | Status: SHIPPED | OUTPATIENT
Start: 2023-11-06

## 2023-11-06 RX ORDER — HYDROXYZINE 50 MG/1
50 TABLET, FILM COATED ORAL EVERY 8 HOURS PRN
Qty: 30 TABLET | Refills: 1 | Status: SHIPPED | OUTPATIENT
Start: 2023-11-06

## 2023-11-06 RX ORDER — TRAZODONE HYDROCHLORIDE 50 MG/1
50 TABLET ORAL
Qty: 30 TABLET | Refills: 1 | Status: SHIPPED | OUTPATIENT
Start: 2023-11-06

## 2023-11-06 RX ADMIN — MIRTAZAPINE 15 MG: 15 TABLET, FILM COATED ORAL at 21:31

## 2023-11-06 RX ADMIN — TRAZODONE HYDROCHLORIDE 25 MG: 50 TABLET ORAL at 21:31

## 2023-11-06 RX ADMIN — THIAMINE HCL TAB 100 MG 100 MG: 100 TAB at 08:36

## 2023-11-06 RX ADMIN — GABAPENTIN 100 MG: 100 CAPSULE ORAL at 15:54

## 2023-11-06 RX ADMIN — Medication 1 TABLET: at 08:36

## 2023-11-06 RX ADMIN — FOLIC ACID 1 MG: 1 TABLET ORAL at 08:36

## 2023-11-06 RX ADMIN — HYDROXYZINE HYDROCHLORIDE 50 MG: 50 TABLET, FILM COATED ORAL at 19:49

## 2023-11-06 RX ADMIN — LORAZEPAM 0.5 MG: 0.5 TABLET ORAL at 10:43

## 2023-11-06 RX ADMIN — GABAPENTIN 100 MG: 100 CAPSULE ORAL at 08:36

## 2023-11-06 NOTE — SOCIAL WORK
MARYLOU met with pt in [facility]. Pt said his vehicle is at [address] [place] [address] and he would need transport there. Tyra Hernandez he was agreeable to outpatient d&a tx.     MARYLOU placed call to pt's wife at [phone] to discuss pt's d/c. No ans; MARYLOU left  requesting callback.

## 2023-11-06 NOTE — NURSING NOTE
Patient visible on the unit. He is pleasant and social with staff and peers. Calm and cooperative. Medication compliant. Initially he denied anxiety or depression. As the morning went on he complained of increased anxiety and requested PRN ativan. Ativan was administered and was effective earlier see previous note. He denied SI,HI, or hallucinations. Q 7 minute safety checks maintained.

## 2023-11-06 NOTE — NURSING NOTE
Patient sitting in dining room playing cards with a peer. He stated ativan given earlier was effective. Continue to monitor.

## 2023-11-06 NOTE — PROGRESS NOTES
Progress Note - Behavioral Health     Alysia Alvarez 66 y.o. male MRN: [id number]   Unit/Bed#: Zakiya Meza 203-02 Encounter: [id number]    Behavior over the last 24 hours: improved. Fran Soliz was seen for continuing care. He reports reduced anxiety with improved sleep and appetite. States he has been taking Atarax as needed which has been effective. Denies any current suicidal ideation or wish to die consistently. Denies any acute withdrawal symptoms. He verbalizes increased insight into his alcohol misuse and is in agreement to follow up outpatient alcohol counseling following discharge. He has been compliant with medication and denies any current side effect. Med education and discharge plan were discussed. Patient verbalized understanding and agreement with the plan. Staff report he is pleasant and cooperative in groups and on the unit.     Sleep: improved  Appetite: normal  Medication side effects: Denies    ROS: all other systems are negative    Mental Status Evaluation:    Appearance:  age appropriate, overweight   Behavior:  pleasant, cooperative, calm   Speech:  normal rate, normal volume   Mood:  "better"   Affect:  normal range and intensity   Thought Process:  goal directed, linear   Associations: intact associations   Thought Content:  no overt delusions   Perceptual Disturbances: none   Risk Potential: Suicidal ideation - None  Homicidal ideation - None   Sensorium:  oriented to person, place, and time/date   Memory:  recent and remote memory grossly intact   Consciousness:  alert and awake   Attention: attention span and concentration are age appropriate   Insight:  improving   Judgment: fair   Gait/Station: normal gait/station   Motor Activity: no abnormal movements     Vital signs in last 24 hours:    Temp:  [97.3 °F (36.3 °C)-98 °F (36.7 °C)] 98 °F (36.7 °C)  HR:  [69-87] 74  Resp:  [18] 18  BP: (113-171)/() 113/62    Laboratory results: I have personally reviewed all pertinent laboratory/tests results. Most Recent Labs:   Lab Results   Component Value Date    WBC 6.61 10/28/2023    RBC 4.04 10/28/2023    HGB 13.8 10/28/2023    HCT 41.5 10/28/2023     10/28/2023    RDW 12.7 10/28/2023    NEUTROABS 3.40 10/28/2023    SODIUM 137 11/03/2023    K 3.9 11/03/2023     11/03/2023    CO2 25 11/03/2023    BUN 13 11/03/2023    CREATININE 0.75 11/03/2023    GLUC 110 11/03/2023    GLUF 110 (H) 11/03/2023    CALCIUM 8.8 11/03/2023    AST 25 11/03/2023    ALT 26 11/03/2023    ALKPHOS 73 11/03/2023    TP 6.6 11/03/2023    ALB 3.7 11/03/2023    TBILI 0.66 11/03/2023    CHOLESTEROL 206 (H) 11/01/2023    HDL 62 11/01/2023    TRIG 119 11/01/2023    LDLCALC 120 (H) 11/01/2023    [address] 144 11/01/2023    [id number] 2.505 10/28/2023       Assessment/Plan   Principal Problem:    MDD (major depressive disorder), recurrent episode, severe (HCC)  Active Problems:    Alcohol abuse    GERD (gastroesophageal reflux disease)    DJD (degenerative joint disease)    Recommended Treatment:     Planned medication and treatment changes:     All current active medications have been reviewed  Encourage group therapy, milieu therapy and occupational therapy  Behavioral Health checks every 7 minutes  Discharge planned for tomorrow    Current Facility-Administered Medications   Medication Dose Route Frequency Provider Last Rate    aluminum-magnesium hydroxide-simethicone  30 mL Oral Q4H PRN Da Burk PA-C      benztropine  1 mg Intramuscular Q4H PRN Max 6/day Da Burk PA-C      benztropine  0.5 mg Oral Q4H PRN Max 6/day Sheldon Masters PA-C      bisacodyl  10 mg Rectal Daily PRN Cynthia Kaba MD      [START ON 12/9/2023] cholecalciferol  1,000 Units Oral Daily Nay Randolph PA-C      ergocalciferol  50,000 Units Oral Weekly Nay Randolph PA-C      folic acid  1 mg Oral Daily Sheldon Cervantes PA-C      gabapentin  100 mg Oral TID Cynthia Kaba MD      glycerin-hypromellose-  1 drop Both Eyes Q3H PRN Robin Cervantes, PA-C      hydrocortisone   Topical 4x Daily PRN Johnathan Darden MD      hydrOXYzine HCL  100 mg Oral Q6H PRN Max 4/day Boris Fry MD      hydrOXYzine HCL  50 mg Oral Q6H PRN Max 4/day Boris Fry MD      ibuprofen  200 mg Oral Q6H PRN Robin Rodriguez Stives, PA-C      ibuprofen  400 mg Oral Q6H PRN Robin Rodriguez Stives, PA-C      ibuprofen  600 mg Oral Q8H PRN Robin Cervantes, PA-C      LORazepam  0.5 mg Oral Q6H PRN Boris Fry MD      mirtazapine  15 mg Oral HS Boris Fry MD      multivitamin-minerals  1 tablet Oral Daily [facility] [place], [place]      nicotine polacrilex  4 mg Oral Q2H PRN Robin Cervantes, PA-C      OLANZapine  5 mg Intramuscular Q3H PRN Max 3/day Robin Cervantes, PA-C      OLANZapine  2.5 mg Oral Q4H PRN Max 6/day Robin Rodriguez [facility], PA-C      OLANZapine  5 mg Oral Q4H PRN Max 3/day Robin Cervantes, PA-C      OLANZapine  5 mg Oral Q3H PRN Max 3/day Robin Cervantes, PA-C      pantoprazole  40 mg Oral Early Morning oRbin Cervantes, PA-C      polyethylene glycol  17 g Oral Daily PRN Boris Fry MD      propranolol  5 mg Oral Q8H PRN Lenette Fix, PA-C      senna-docusate sodium  1 tablet Oral Daily PRN Robin Cervantes, PA-C      thiamine  100 mg Oral Daily Robin Michael [facility], PA-C      traZODone  50 mg Oral HS PRN Robin Cervantes, PA-C      traZODone  50 mg Oral Q6H PRN Max 3/day Robin Cervantes, PA-C         Risks / Benefits of Treatment:    Risks, benefits, and possible side effects of medications explained to patient and patient verbalizes understanding and agreement for treatment. Counseling / Coordination of Care:    Patient's progress discussed with staff in treatment team meeting. Medications, treatment progress and treatment plan reviewed with patient. Supportive therapy provided to patient. Group attendance encouraged. Discharge plan discussed with patient.     Austyn Beck MD 11/06/23

## 2023-11-06 NOTE — PROGRESS NOTES
Progress Note - Tonja Craig 66 y.o. male MRN: [id number]    Unit/Bed#: Yanet Thomson 826-48 Encounter: [id number]        Subjective:   Patient seen and examined at bedside after reviewing the chart and discussing the case with the caring staff. Patient examined at bedside. Patient reports no acute symptoms. Physical Exam   Vitals: Blood pressure 113/62, pulse 74, temperature 98 °F (36.7 °C), temperature source Temporal, resp. rate 18, height 5' 8" (1.727 m), weight 102 kg (225 lb 12.8 oz), SpO2 99 %. ,Body mass index is 34.33 kg/m². Constitutional: Patient in no acute distress. HEENT: PERR, EOMI, MMM. Cardiovascular: Normal rate and regular rhythm. Pulmonary/Chest: Effort normal and breath sounds normal.   Abdomen: Soft, + BS, NT. Assessment/Plan:  Tonja Craig is a(n) 66y.o. year old male with MDD. 1.  Alcohol abuse. Patient started on thiamine, folic acid and multivitamin. 2.  GERD. Continue Protonix 40 mg daily. 3.  Hyperlipidemia. Lifestyle modifications. 4.  DJD/OA. Ibuprofen as needed. 5.  Hemorrhoids. Apply Anusol cream as needed. 6.  Vitamin D deficiency. Patient started on vitamin D2 50,000 units weekly for 6 weeks followed by vitamin D3 1000 units daily.

## 2023-11-06 NOTE — TREATMENT TEAM
11/06/23 1040   Moul[place] Anxiety Scale   Anxious Mood 3   Tension 3   Fears 3   Insomnia 0   Intellectual 3   Depressed Mood 3   Somatic Complaints: Muscular 3   Somatic Complaints: Sensory 3   Cardiovascular Symptoms 1   Respiratory Symptoms 0   Gastrointestinal Symptoms 0   Genitourinary Symptoms 0   Autonomic Symptoms 0   Behavior at Interview 3   Moulton Anxiety Score 25     Pt stated he was having a " panic attack." Pt was medicated with PRN ativan 0.5mg.

## 2023-11-06 NOTE — NURSING NOTE
Patient visible on the unit. Pleasant and cooperative. Social with peers. Denies SI, HI, hallucinations,and depression. Endorses moderate anxiety and neck pain. Atarax and Motrin given. Trazodone given for sleep. Able to make needs known. Continuous rounding maintained. Will continue to monitor and access.

## 2023-11-07 VITALS
DIASTOLIC BLOOD PRESSURE: 80 MMHG | HEIGHT: 68 IN | BODY MASS INDEX: 34.22 KG/M2 | RESPIRATION RATE: 18 BRPM | HEART RATE: 86 BPM | SYSTOLIC BLOOD PRESSURE: 128 MMHG | OXYGEN SATURATION: 98 % | WEIGHT: 225.8 LBS | TEMPERATURE: 98.2 F

## 2023-11-07 PROBLEM — F33.2 MDD (MAJOR DEPRESSIVE DISORDER), RECURRENT EPISODE, SEVERE (HCC): Status: RESOLVED | Noted: 2023-11-01 | Resolved: 2023-11-07

## 2023-11-07 PROBLEM — F10.10 ALCOHOL ABUSE: Status: RESOLVED | Noted: 2023-11-01 | Resolved: 2023-11-07

## 2023-11-07 PROCEDURE — 99238 HOSP IP/OBS DSCHRG MGMT 30/<: CPT | Performed by: PSYCHIATRY & NEUROLOGY

## 2023-11-07 RX ORDER — HYDROCORTISONE 25 MG/G
CREAM TOPICAL 4 TIMES DAILY PRN
Qty: 28 G | Refills: 0 | Status: SHIPPED | OUTPATIENT
Start: 2023-11-07

## 2023-11-07 RX ORDER — FOLIC ACID 1 MG/1
1 TABLET ORAL DAILY
Qty: 30 TABLET | Refills: 0 | Status: SHIPPED | OUTPATIENT
Start: 2023-11-08

## 2023-11-07 RX ORDER — PANTOPRAZOLE SODIUM 40 MG/1
40 TABLET, DELAYED RELEASE ORAL
Qty: 30 TABLET | Refills: 0 | Status: SHIPPED | OUTPATIENT
Start: 2023-11-08

## 2023-11-07 RX ORDER — LANOLIN ALCOHOL/MO/W.PET/CERES
100 CREAM (GRAM) TOPICAL DAILY
Qty: 30 TABLET | Refills: 0 | Status: SHIPPED | OUTPATIENT
Start: 2023-11-08

## 2023-11-07 RX ORDER — ERGOCALCIFEROL 1.25 MG/1
50000 CAPSULE ORAL WEEKLY
Qty: 5 CAPSULE | Refills: 0 | Status: SHIPPED | OUTPATIENT
Start: 2023-11-10 | End: 2023-12-09

## 2023-11-07 RX ORDER — IBUPROFEN 600 MG/1
600 TABLET ORAL EVERY 8 HOURS PRN
Qty: 60 TABLET | Refills: 0 | Status: SHIPPED | OUTPATIENT
Start: 2023-11-07

## 2023-11-07 RX ORDER — MELATONIN
1000 DAILY
Qty: 30 TABLET | Refills: 0 | Status: SHIPPED | OUTPATIENT
Start: 2023-12-09

## 2023-11-07 RX ADMIN — HYDROXYZINE HYDROCHLORIDE 50 MG: 50 TABLET, FILM COATED ORAL at 10:08

## 2023-11-07 RX ADMIN — GABAPENTIN 100 MG: 100 CAPSULE ORAL at 08:34

## 2023-11-07 RX ADMIN — Medication 1 TABLET: at 08:34

## 2023-11-07 RX ADMIN — FOLIC ACID 1 MG: 1 TABLET ORAL at 08:34

## 2023-11-07 RX ADMIN — THIAMINE HCL TAB 100 MG 100 MG: 100 TAB at 08:34

## 2023-11-07 NOTE — NURSING NOTE
Patient reported feeling increased anxiety r/t unit stimulation and utilized prn Atarax 50 for Moulton score of 18. Will monitor and f/u for effectiveness.

## 2023-11-07 NOTE — DISCHARGE INSTR - OTHER ORDERS
Isaac Schmidt, you are being discharged to your home at [address] in [address] [facility] [address] . Your contact number is [phone]. Triggers you have identified during your hospitalization that led to your admission include a distressed mood. Coping skills you have identified during your hospitalization include exercising. If you are unable to deal with your distressed mood alone please contact your psychiatric provider at [facility] at [phone],  your primary care provider Dr Elisabet Bender at [phone], or your wife Ashley at [phone]. If that is not effective and you continue to have a distressed mood, feel overwhelmed, or are in crisis, please contact Pete Arteagaraheem Kevin at [phone], dial [phone], or go to the nearest emergency center. [place] Crisis Hotline: [facility] Suicide Prevention Lifeline:  858.139.2459  *Alcohol Anonymous: 894.876.6530  *[place] Drug & Alcohol Commission: (642) 281-3134  [address] on [address] [place] [address]) [place]: 406.701.3046/Website: www.david.org  *Substance Abuse and [facility] Administration([facility]) [employer], which is a confidential, free, 24-hour-a-day, 365-day-a-year, information service for individuals and family members facing mental health and/or substance use disorders. This service provides referrals to local treatment facilities, support groups, and community-based organizations. Callers can also order free publications and other information. Call 5-740.941.8589/Website: www.[facility].gov  *[employer] 2-1-1: This is a toll free, confidential, 24-hour-a-day service which connects you to a community  in your area who can help you find services and resources that are available to you locally and provide critical services that can improve and save lives.   Call: [address]: https://[employer]/     Mark Mckeon or Linda, our Tarik and William, will be calling you after your discharge, on the phone number that you provided. They will be available as an additional support, if needed.  If you wish to speak with one of them, you may contact Fuad Corrales at [phone] or Vivian Gaucher at [phone]

## 2023-11-07 NOTE — SOCIAL WORK
MARYLOU placed call to [facility] to follow up on psych appt referral. MARYLOU left  requesting callback.

## 2023-11-07 NOTE — PROGRESS NOTES
Progress Note - Jesse Sales 66 y.o. male MRN: [id number]    Unit/Bed#: Cheryl [place] 400-31 Encounter: [id number]        Subjective:   Patient seen and examined at bedside after reviewing the chart and discussing the case with the caring staff. Patient examined at bedside. Patient reports no acute symptoms. Patient is being discharged today. Patient is requesting all his prescriptions. I reviewed and reconciled patient's problem list and medications. Physical Exam   Vitals: Blood pressure 128/80, pulse 86, temperature 98.2 °F (36.8 °C), temperature source Temporal, resp. rate 18, height 5' 8" (1.727 m), weight 102 kg (225 lb 12.8 oz), SpO2 98 %. ,Body mass index is 34.33 kg/m². Constitutional: Patient in no acute distress. HEENT: PERR, EOMI, MMM. Cardiovascular: Normal rate and regular rhythm. Pulmonary/Chest: Effort normal and breath sounds normal.   Abdomen: Soft, + BS, NT. Assessment/Plan:  Jesse Sales is a(n) 66y.o. year old male with MDD. Medical clearance. Patient is medically cleared for discharge. All scripts will be sent out for the patient. 1.  Alcohol abuse. Patient started on thiamine, folic acid and multivitamin. 2.  GERD. Continue Protonix 40 mg daily. 3.  Hyperlipidemia. Lifestyle modifications. 4.  DJD/OA. Ibuprofen as needed. 5.  Hemorrhoids. Apply Anusol cream as needed. 6.  Vitamin D deficiency. Patient started on vitamin D2 50,000 units weekly for 6 weeks followed by vitamin D3 1000 units daily.

## 2023-11-07 NOTE — PLAN OF CARE
Problem: Ineffective Coping  Goal: Participates in unit activities  Description: Interventions:  - Provide therapeutic environment   - Provide required programming   - Redirect inappropriate behaviors   Outcome: Progressing     Problem: Depression  Goal: Refrain from harming self  Description: Interventions:  - Monitor patient closely, per order   - Supervise medication ingestion, monitor effects and side effects   Outcome: Progressing

## 2023-11-07 NOTE — NURSING NOTE
Patient stated atarax was effective in decreasing anxiety. He is calm and pleasant and going over his discharge belongings with staff.
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
## 2023-11-07 NOTE — SOCIAL WORK
SW placed call to Maujasmin Osler pt's wife at [phone] to inform of pt d/c today and follow-up.

## 2023-11-07 NOTE — NURSING NOTE
Patient requesting PRN medication for mild anxiety over pending discharge, medication changes. No out art S/S of anxiety, mostly internalized per patient. PRN Atarax given for Yveramona Rosas of 10. Remains on 7" checks for safety and behaviors.

## 2023-11-07 NOTE — PLAN OF CARE
Problem: Nutrition/Hydration-ADULT  Goal: Nutrient/Hydration intake appropriate for improving, restoring or maintaining nutritional needs  Description: Monitor and assess patient's nutrition/hydration status for malnutrition. Collaborate with interdisciplinary team and initiate plan and interventions as ordered. Monitor patient's weight and dietary intake as ordered or per policy. Utilize nutrition screening tool and intervene as necessary. Determine patient's food preferences and provide high-protein, high-caloric foods as appropriate.      INTERVENTIONS:  - Monitor oral intake, urinary output, labs, and treatment plans  - Assess nutrition and hydration status and recommend course of action  - Evaluate amount of meals eaten  - Assist patient with eating if necessary   - Allow adequate time for meals  - Recommend/ encourage appropriate diets, oral nutritional supplements, and vitamin/mineral supplements  - Order, calculate, and assess calorie counts as needed  - Recommend, monitor, and adjust tube feedings and TPN/PPN based on assessed needs  - Assess need for intravenous fluids  - Provide specific nutrition/hydration education as appropriate  - Include patient/family/caregiver in decisions related to nutrition  Outcome: Adequate for Discharge

## 2023-11-07 NOTE — PROGRESS NOTES
Pt is leaving with the following Belongings       Black socks  Grey t-shirt  Grey underwear  Jeans  Black slides  Black Glasses       [employer]       [employer]  Debit card  [employer] card  Misc.cards  74 dollars cash  3 -20s  2 -5s  4-1s   3.95 in coins  1 drivers license     Iphone-broken case  Ipad-cracked screen   Car keys    Pt signed and agreed to the following

## 2023-11-07 NOTE — SOCIAL WORK
MARYLOU obtained approval for hospital-funded transport from [facility]. MARYLOU arranged Roundtrip transport via medical sedan for 3:00 p.m.

## 2023-11-07 NOTE — SOCIAL WORK
SW placed call to [facility] and [place] scheduled pt for intake on 11/15/23 at 3:30 p.m. with Tracey Najjar.
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
## 2023-11-07 NOTE — BH TRANSITION RECORD
Contact Information: If you have any questions, concerns, pended studies, tests and/or procedures, or emergencies regarding your inpatient behavioral health visit. Please contact at [address] [place] [address] Street at  and ask to speak to a , nurse or physician. A contact is available 24 hours/ 7 days a week at this number. Summary of Procedures Performed During your Stay:  Below is a list of major procedures performed during your hospital stay and a summary of results:  - No major procedures performed. Pending Studies (From admission, onward)      None          Please follow up on the above pending studies with your PCP and/or referring provider.

## 2023-11-07 NOTE — NURSING NOTE
Patient has been visible on the unit. He is pleasant and cooperative. Medication compliant. Anxious about discharge. Denied Depression,SI,HI, or hallucinations. Awaiting discharge. Feels ready to be discharged.

## 2023-11-07 NOTE — SOCIAL WORK
MARYLOU placed call to [facility] at [phone] to schedule intake.  indicated facesheet and PE should be faxed to [phone].  MARYLOU faxed documents to [facility] and will follow up on referral.

## 2023-11-07 NOTE — SOCIAL WORK
MARYLOU noted pt has [facility]. Placed call to [address] [place] [address] to schedule d&a evaluation. Yanick Ambrose stated that evaluations are walk-in and provided alternate treatment facility: [facility] [phone]. Not in service. MARYLOU obtained LYNN:    [facility]  [id number] [place] [address], [address] [facility]  Ph:  [phone]    MARYLOU placed call to [facility] and spoke with Mane Aleman. Facility address is [address] [place] [address], [address] [facility] Phone: [phone]. Mane Aleman took pt demographic information and reviewed med list. [facility] does not provide psych med man. Mane Aleman scheduled pt on Friday 11/17/23 at 12:00 p.m.  with Esteban Varela. [facility]  will call 12/16 to confirm appt. MARYLOU obtained LYNN:    [address] [facility]  Ph: [phone]\  Fax: [phone]    MARYLOU sent PE and med list to [facility] at [email or web address]. MARYLOU to follow up with call to intake line. MARYLOU placed call to [facility] at [phone] to schedule pt for initial visit. Nestor Parrish scheduled pt for new pt appointment on Wednesday 12/6/23 at 11:10 a.m. Requested pt change pcp to Dr Gagandeep Patel.

## 2023-11-07 NOTE — NURSING NOTE
Patient noted to be visible on the milieu and social w/ select peers. Denied SI/HI/AVH and depression. Endorsed moderate anxiety during interaction r/t to upcoming discharge. Refused hs Gabapentin. No longer reports side effects. Utilized partial dose of prn Trazodone for sleep. Offers no further complaints at this time. Q 7 min safety checks continuous and maintained.

## 2023-11-07 NOTE — NURSING NOTE
Patient noted to have broken sleep throughout the night. Non labored breathing noted while asleep. Q 7 min safety checks maintained.

## 2023-11-07 NOTE — PLAN OF CARE
Problem: PAIN - ADULT  Goal: Verbalizes/displays adequate comfort level or baseline comfort level  Description: Interventions:  - Encourage patient to monitor pain and request assistance  - Assess pain using appropriate pain scale  - Administer analgesics based on type and severity of pain and evaluate response  - Implement non-pharmacological measures as appropriate and evaluate response  - Consider cultural and social influences on pain and pain management  - Notify physician/advanced practitioner if interventions unsuccessful or patient reports new pain  Outcome: Adequate for Discharge     Problem: Nutrition/Hydration-ADULT  Goal: Nutrient/Hydration intake appropriate for improving, restoring or maintaining nutritional needs  Description: Monitor and assess patient's nutrition/hydration status for malnutrition. Collaborate with interdisciplinary team and initiate plan and interventions as ordered. Monitor patient's weight and dietary intake as ordered or per policy. Utilize nutrition screening tool and intervene as necessary. Determine patient's food preferences and provide high-protein, high-caloric foods as appropriate.      INTERVENTIONS:  - Monitor oral intake, urinary output, labs, and treatment plans  - Assess nutrition and hydration status and recommend course of action  - Evaluate amount of meals eaten  - Assist patient with eating if necessary   - Allow adequate time for meals  - Recommend/ encourage appropriate diets, oral nutritional supplements, and vitamin/mineral supplements  - Order, calculate, and assess calorie counts as needed  - Recommend, monitor, and adjust tube feedings and TPN/PPN based on assessed needs  - Assess need for intravenous fluids  - Provide specific nutrition/hydration education as appropriate  - Include patient/family/caregiver in decisions related to nutrition  Outcome: Adequate for Discharge     Problem: Ineffective Coping  Goal: Demonstrates healthy coping skills  Outcome: Adequate for Discharge  Goal: Patient/Family participate in treatment and DC plans  Description: Interventions:  - Provide therapeutic environment  Outcome: Adequate for Discharge  Goal: Patient/Family verbalizes awareness of resources  Outcome: Adequate for Discharge     Problem: Risk for Self Injury/Neglect  Goal: Treatment Goal: Remain safe during length of stay, learn and adopt new coping skills, and be free of self-injurious ideation, impulses and acts at the time of discharge  Outcome: Adequate for Discharge  Goal: Attend and participate in unit activities, including therapeutic, recreational, and educational groups  Description: Interventions:  - Provide therapeutic and educational activities daily, encourage attendance and participation, and document same in the medical record  - Obtain collateral information, encourage visitation and family involvement in care   Outcome: Adequate for Discharge  Goal: Recognize maladaptive responses and adopt new coping mechanisms  Outcome: Adequate for Discharge  Goal: Complete daily ADLs, including personal hygiene independently, as able  Description: Interventions:  - Observe, teach, and assist patient with ADLS  - Monitor and promote a balance of rest/activity, with adequate nutrition and elimination  Outcome: Adequate for Discharge     Problem: Depression  Goal: Treatment Goal: Demonstrate behavioral control of depressive symptoms, verbalize feelings of improved mood/affect, and adopt new coping skills prior to discharge  Outcome: Adequate for Discharge  Goal: Verbalize thoughts and feelings  Description: Interventions:  - Assess and re-assess patient's level of risk   - Engage patient in 1:1 interactions, daily, for a minimum of 15 minutes   - Encourage patient to express feelings, fears, frustrations, hopes   Outcome: Adequate for Discharge  Goal: Refrain from harming self  Description: Interventions:  - Monitor patient closely, per order   - Supervise medication ingestion, monitor effects and side effects   Outcome: Adequate for Discharge  Goal: Refrain from isolation  Description: Interventions:  - Develop a trusting relationship   - Encourage socialization   Outcome: Adequate for Discharge  Goal: Refrain from self-neglect  Outcome: Adequate for Discharge     Problem: Anxiety  Goal: Anxiety is at manageable level  Description: Interventions:  - Assess and monitor patient's anxiety level. - Monitor for signs and symptoms (heart palpitations, chest pain, shortness of breath, headaches, nausea, feeling jumpy, restlessness, irritable, apprehensive). - Collaborate with interdisciplinary team and initiate plan and interventions as ordered.   - [place] patient to unit/surroundings  - Explain treatment plan  - Encourage participation in care  - Encourage verbalization of concerns/fears  - Identify coping mechanisms  - Assist in developing anxiety-reducing skills  - Administer/offer alternative therapies  - Limit or eliminate stimulants  Outcome: Adequate for Discharge     Problem: DISCHARGE PLANNING - CARE MANAGEMENT  Goal: Discharge to post-acute care or home with appropriate resources  Description: INTERVENTIONS:  - Conduct assessment to determine patient/family and health care team treatment goals, and need for post-acute services based on payer coverage, community resources, and patient preferences, and barriers to discharge  - Address psychosocial, clinical, and financial barriers to discharge as identified in assessment in conjunction with the patient/family and health care team  - Arrange appropriate level of post-acute services according to patient’s   needs and preference and payer coverage in collaboration with the physician and health care team  - Communicate with and update the patient/family, physician, and health care team regarding progress on the discharge plan  - Arrange appropriate transportation to post-acute venues  Outcome: Adequate for Discharge

## 2024-01-29 ENCOUNTER — PREP FOR PROCEDURE (OUTPATIENT)
Age: 64
End: 2024-01-29

## 2024-01-29 ENCOUNTER — TELEPHONE (OUTPATIENT)
Age: 64
End: 2024-01-29

## 2024-01-29 DIAGNOSIS — Z12.11 SCREENING FOR COLON CANCER: Primary | ICD-10-CM

## 2024-01-29 NOTE — TELEPHONE ENCOUNTER
Scheduled date of colonoscopy (as of today): 2-   Physician performing colonoscopy: Adela  Location of colonoscopy: [facility]  Bowel prep reviewed with patient: miralax dulcolax sent via email  Instructions reviewed with patient by: nat  Clearances: n/a

## 2024-01-29 NOTE — TELEPHONE ENCOUNTER
01/29/24  Screened by: Shawnee Vieira    Referring Provider Dr. Moore    Pre- Screening:     There is no height or weight on file to calculate BMI.  Has patient been referred for a routine screening Colonoscopy? yes  Is the patient between 45-75 years old? yes      Previous Colonoscopy no   If yes:    Date:     Facility:     Reason:       SCHEDULING STAFF: If the patient is between 45yrs-49yrs, please advise patient to confirm benefits/coverage with their insurance company for a routine screening colonoscopy, some insurance carriers will only cover at 50yrs or older. If the patient is over 75years old, please schedule an office visit.     Does the patient want to see a Gastroenterologist prior to their procedure OR are they having any GI symptoms? no    Has the patient been hospitalized or had abdominal surgery in the past 6 months? no    Does the patient use supplemental oxygen? no    Does the patient take Coumadin, Lovenox, Plavix, Elliquis, Xarelto, or other blood thinning medication? no    Has the patient had a stroke, cardiac event, or stent placed in the past year? no    Passed oa     SCHEDULING STAFF: If patient answers NO to above questions, then schedule procedure. If patient answers YES to above questions, then schedule office appointment.     If patient is between 45yrs - 49yrs, please advise patient that we will have to confirm benefits & coverage with their insurance company for a routine screening colonoscopy.

## 2024-02-18 ENCOUNTER — APPOINTMENT (EMERGENCY)
Dept: RADIOLOGY | Facility: HOSPITAL | Age: 64
End: 2024-02-18
Payer: COMMERCIAL

## 2024-02-18 ENCOUNTER — HOSPITAL ENCOUNTER (EMERGENCY)
Facility: HOSPITAL | Age: 64
Discharge: HOME | End: 2024-02-18
Attending: EMERGENCY MEDICINE
Payer: COMMERCIAL

## 2024-02-18 VITALS
HEART RATE: 80 BPM | TEMPERATURE: 97.4 F | OXYGEN SATURATION: 97 % | DIASTOLIC BLOOD PRESSURE: 63 MMHG | RESPIRATION RATE: 20 BRPM | SYSTOLIC BLOOD PRESSURE: 137 MMHG

## 2024-02-18 DIAGNOSIS — M54.2 NECK PAIN: Primary | ICD-10-CM

## 2024-02-18 PROCEDURE — 99284 EMERGENCY DEPT VISIT MOD MDM: CPT

## 2024-02-18 PROCEDURE — 72040 X-RAY EXAM NECK SPINE 2-3 VW: CPT

## 2024-02-18 RX ORDER — ACETAMINOPHEN 325 MG/1
650 TABLET ORAL ONCE
Status: COMPLETED | OUTPATIENT
Start: 2024-02-18 | End: 2024-02-18

## 2024-02-18 RX ORDER — KETOROLAC TROMETHAMINE 30 MG/ML
15 INJECTION, SOLUTION INTRAMUSCULAR; INTRAVENOUS ONCE
Status: DISCONTINUED | OUTPATIENT
Start: 2024-02-18 | End: 2024-02-18 | Stop reason: HOSPADM

## 2024-02-18 RX ADMIN — ACETAMINOPHEN 650 MG: 325 TABLET ORAL at 14:56

## 2024-02-18 NOTE — DISCHARGE INSTRUCTIONS
Continue to Tylenol and Motrin as needed for pain management. May also apply heat and ice as needed. PCP referral made. Return to ED for worsening symptoms.

## 2024-02-18 NOTE — ED PROVIDER NOTES
History  Chief Complaint   Patient presents with    Fall     FALL on ice on valentines day. Struck head, has headache, neck and bilateral shoulder pain. No thinners     Patient is a 63 year old male with a past medical history including anxiety and previous ETOH abuse. Presents with a chief complaint of head and neck pain following a fall on 02/14. States that he slipped on black ice and fell flat on his back hitting his head. No LOC. Denies ASA or thinner use. States his head pain is a 5/10 and last pain medication was ibuprofen at 4 am today. Also complains of neck pain and pain across the back of his shoulders.           Prior to Admission Medications   Prescriptions Last Dose Informant Patient Reported? Taking?   cholecalciferol (VITAMIN D3) 1,000 units tablet Not Taking  No No   Sig: Take 1 tablet (1,000 Units total) by mouth daily Do not start before December 9, 2023.   Patient not taking: Reported on 2/18/2024   ergocalciferol (VITAMIN D2) 50,000 units   No No   Sig: Take 1 capsule (50,000 Units total) by mouth once a week for 5 doses Do not start before November 10, 2023.   folic acid (FOLVITE) 1 mg tablet Not Taking  No No   Sig: Take 1 tablet (1 mg total) by mouth daily Do not start before November 8, 2023.   Patient not taking: Reported on 2/18/2024   gabapentin (NEURONTIN) 100 mg capsule Not Taking  No No   Sig: Take 1 capsule (100 mg total) by mouth 3 (three) times a day   Patient not taking: Reported on 2/18/2024   hydrOXYzine HCL (ATARAX) 50 mg tablet Not Taking  No No   Sig: Take 1 tablet (50 mg total) by mouth every 8 (eight) hours as needed for anxiety (mild anxiety)   Patient not taking: Reported on 2/18/2024   hydrocortisone (ANUSOL-HC) 2.5 % rectal cream Not Taking  No No   Sig: Apply topically 4 (four) times a day as needed for hemorrhoids   Patient not taking: Reported on 2/18/2024   ibuprofen (MOTRIN) 600 mg tablet Not Taking  No No   Sig: Take 1 tablet (600 mg total) by mouth every 8 (eight)  hours as needed for headaches or moderate pain   Patient not taking: Reported on 2/18/2024   mirtazapine (REMERON) 15 mg tablet Not Taking  No No   Sig: Take 1 tablet (15 mg total) by mouth daily at bedtime   Patient not taking: Reported on 2/18/2024   nicotine polacrilex (NICORETTE) 4 mg gum Not Taking  No No   Sig: Chew 1 each (4 mg total) every 2 (two) hours as needed for smoking cessation   Patient not taking: Reported on 2/18/2024   pantoprazole (PROTONIX) 40 mg tablet Not Taking  No No   Sig: Take 1 tablet (40 mg total) by mouth daily in the early morning Do not start before November 8, 2023.   Patient not taking: Reported on 2/18/2024   thiamine 100 MG tablet Not Taking  No No   Sig: Take 1 tablet (100 mg total) by mouth daily Do not start before November 8, 2023.   Patient not taking: Reported on 2/18/2024   traZODone (DESYREL) 50 mg tablet Not Taking  No No   Sig: Take 1 tablet (50 mg total) by mouth daily at bedtime as needed for sleep   Patient not taking: Reported on 2/18/2024      Facility-Administered Medications: None       Past Medical History:   Diagnosis Date    Anxiety     Constipation     ETOH abuse     Hemorrhoids     No known health problems        Past Surgical History:   Procedure Laterality Date    LUNG LOBECTOMY      PLEURAL SCARIFICATION         Family History   Problem Relation Age of Onset    Alzheimer's disease Mother     Heart failure Father     Kidney disease Father     Kidney failure Father     Hyperlipidemia Father     No Known Problems Sister     No Known Problems Brother     No Known Problems Maternal Grandmother     No Known Problems Maternal Grandfather     No Known Problems Paternal Grandmother     No Known Problems Paternal Grandfather     No Known Problems Maternal Aunt     No Known Problems Maternal Uncle     No Known Problems Paternal Aunt     No Known Problems Paternal Uncle     No Known Problems Cousin      I have reviewed and agree with the history as  documented.    E-Cigarette/Vaping    E-Cigarette Use Never User      E-Cigarette/Vaping Substances    Nicotine No     Flavoring No      Social History     Tobacco Use    Smoking status: Former    Smokeless tobacco: Never    Tobacco comments:     25 years ago   Vaping Use    Vaping status: Never Used   Substance Use Topics    Alcohol use: Yes     Alcohol/week: 70.0 standard drinks of alcohol     Types: 70 Glasses of wine per week     Comment: social    Drug use: No       Review of Systems   Constitutional:  Negative for chills and fever.   Eyes:  Positive for visual disturbance (blurry vision at baseline). Negative for pain.   Respiratory:  Negative for chest tightness and shortness of breath.    Cardiovascular:  Negative for chest pain and palpitations.   Musculoskeletal:  Positive for myalgias.   Neurological:  Positive for headaches.       Physical Exam  Physical Exam  Vitals and nursing note reviewed.   Constitutional:       General: He is not in acute distress.     Appearance: Normal appearance. He is well-developed.   HENT:      Head: Normocephalic and atraumatic.   Eyes:      Extraocular Movements: Extraocular movements intact.      Pupils: Pupils are equal, round, and reactive to light.   Cardiovascular:      Rate and Rhythm: Normal rate and regular rhythm.      Heart sounds: Normal heart sounds. No murmur heard.  Pulmonary:      Effort: Pulmonary effort is normal. No respiratory distress.      Breath sounds: Normal breath sounds.   Musculoskeletal:         General: Tenderness present. No swelling.      Right shoulder: Tenderness present. No bony tenderness. Normal range of motion. Normal strength.      Left shoulder: Tenderness present. No bony tenderness. Normal range of motion. Normal strength.      Cervical back: Neck supple. No tenderness.      Comments: Tenderness upon palpation along trapezius muscle. No bony tenderness noted.   Skin:     General: Skin is warm and dry.      Capillary Refill: Capillary  refill takes less than 2 seconds.   Neurological:      Mental Status: He is alert and oriented to person, place, and time.   Psychiatric:         Mood and Affect: Mood normal.         Behavior: Behavior normal.         Vital Signs  ED Triage Vitals [02/18/24 1417]   Temperature Pulse Respirations Blood Pressure SpO2   (!) 97.4 °F (36.3 °C) 80 20 137/63 97 %      Temp Source Heart Rate Source Patient Position - Orthostatic VS BP Location FiO2 (%)   Temporal Monitor Lying Left arm --      Pain Score       5           Vitals:    02/18/24 1417   BP: 137/63   Pulse: 80   Patient Position - Orthostatic VS: Lying         Visual Acuity      ED Medications  Medications   ketorolac (TORADOL) injection 15 mg (15 mg Intramuscular Not Given 2/18/24 1457)   acetaminophen (TYLENOL) tablet 650 mg (650 mg Oral Given 2/18/24 1456)       Diagnostic Studies  Results Reviewed       None                   XR cervical spine 2 or 3 views   ED Interpretation by FRANKY Fan (02/18 1531)   No acute osseous findings                 Procedures  Procedures         ED Course                                             Medical Decision Making  Patient is a 63 year old male presenting for head and neck pain following a fall on 02/14. Upon arrival to room, patient is resting comfortably on the stretcher moving his neck freely. Pupils 3+ bilaterally and reactive. Sensation intact bilaterally and patient denies any numbness or tingling aside from his neuropathy at baseline. Normal heart sounds and clear lung fields noted. Patient admits to some tenderness across the trapezius muscles. No tenderness elicited in the cervical, thoracic, or lumbar spine.    Discussed with patient the following treatment plan and patient is in agreement: x-ray and pain medications    After examination of the patient, differentials include but are not limited to: cervical fracture and muscle strain.    Discussed with patient image results. Symptoms today  likely related to muscle tightness secondary to the fall. Offered patient prescription for pain management, but patient declined stating that he only wants to use a heating pad. Advised patient to continue to use Tylenol and Motrin at home for pain management. May also use heat and ice as needed. ED return precautions reviewed. PCP referral made.     Amount and/or Complexity of Data Reviewed  Radiology: ordered.     Details: Reviewed with patient.    Risk  OTC drugs.             Disposition  Final diagnoses:   Neck pain     Time reflects when diagnosis was documented in both MDM as applicable and the Disposition within this note       Time User Action Codes Description Comment    2/18/2024  2:49 PM Litzy Heath Add [M54.2] Neck pain           ED Disposition       ED Disposition   Discharge    Condition   Stable    Date/Time   Sun Feb 18, 2024  3:32 PM    Comment   Bigg Farley discharge to home/self care.                   Follow-up Information       Follow up With Specialties Details Why Contact Info Additional Information    [facility] Emergency Department Emergency Medicine  If symptoms worsen [address] [facility] [address] [phone] [facility] Emergency Department, [address], [place] [address]            Patient's Medications   Discharge Prescriptions    No medications on file           PDMP Review       None            ED Provider  Electronically Signed by             FRANKY Fan  02/18/24 1030

## 2024-05-02 ENCOUNTER — TELEPHONE (OUTPATIENT)
Age: 64
End: 2024-05-02

## 2024-05-02 NOTE — TELEPHONE ENCOUNTER
Verifying PCP for ambulatory referral for family medicine/internal med     Patient is seen at     [facility] [place] [address] [place] [id number] [phone]

## 2024-09-20 ENCOUNTER — EVALUATION (OUTPATIENT)
Age: 64
End: 2024-09-20
Payer: COMMERCIAL

## 2024-09-20 DIAGNOSIS — M54.12 CERVICAL RADICULOPATHY: Primary | ICD-10-CM

## 2024-09-20 DIAGNOSIS — G89.29 CHRONIC LEFT SHOULDER PAIN: ICD-10-CM

## 2024-09-20 DIAGNOSIS — M25.512 CHRONIC LEFT SHOULDER PAIN: ICD-10-CM

## 2024-09-20 PROCEDURE — 97163 PT EVAL HIGH COMPLEX 45 MIN: CPT | Performed by: PHYSICAL THERAPIST

## 2024-09-20 NOTE — PROGRESS NOTES
"PT Evaluation     Today's date: 2024  Patient name: Bigg Farley  : 1960  MRN: [id number]  Referring provider: Oscar Collier MD  Dx:   Encounter Diagnosis     ICD-10-CM    1. Cervical radiculopathy  M54.12       2. Chronic left shoulder pain  M25.512     G89.29                      Assessment/Plan    Subjective Evaluation    History of Present Illness  Mechanism of injury: Patient reports he fell on  Day.  He said he hit his head and fell on his L shoulder.   He slipped on black ice outside of [employer] (possible lawsuit).  He said he has radiating pain down both arms but mostly the LUE.  Patient said that he went to a chiropractor for several months.  He said that he has improved slightly but he is still having issues.  Patient also reports he is having some balance issues as well at this time.  PT will focus on the L shoulder and neck today.  NV balance will be assessed.  Pain   Neck/L Shoulder  Currently 4/10  At Best 4/10  At Worst 8-9/10  Patient described his pain as a dull ache localized to the left shoulder.    AGGS: OH activities, random events like standing   EASES: chiropractor  Patient's Goal: \"I want to be able to work without any issues and get my old life back.\"          Objective           Precautions:       Manuals                                                                 Neuro Re-Ed                                                                                                        Ther Ex                                                                                                                     Ther Activity                                       Gait Training                                       Modalities                                            "

## 2024-09-27 ENCOUNTER — OFFICE VISIT (OUTPATIENT)
Age: 64
End: 2024-09-27
Payer: COMMERCIAL

## 2024-09-27 DIAGNOSIS — G89.29 CHRONIC LEFT SHOULDER PAIN: ICD-10-CM

## 2024-09-27 DIAGNOSIS — M25.512 CHRONIC LEFT SHOULDER PAIN: ICD-10-CM

## 2024-09-27 DIAGNOSIS — M54.12 CERVICAL RADICULOPATHY: Primary | ICD-10-CM

## 2024-09-27 PROCEDURE — 97140 MANUAL THERAPY 1/> REGIONS: CPT | Performed by: PHYSICAL THERAPIST

## 2024-09-29 NOTE — PROGRESS NOTES
"PT Evaluation     Today's date: 2024  Patient name: Bigg Farley  : 1960  MRN: [id number]  Referring provider: Oscar Collier MD  Dx:   Encounter Diagnosis     ICD-10-CM    1. Cervical radiculopathy  M54.12       2. Chronic left shoulder pain  M25.512     G89.29                      Assessment/Plan    Subjective Evaluation    History of Present Illness  Mechanism of injury: Patient reports he fell on  Day.  He said he hit his head and fell on his L shoulder.   He slipped on black ice outside of [employer] (possible lawsuit).  He said he has radiating pain down both arms but mostly the LUE.  Patient said that he went to a chiropractor for several months.  He said that he has improved slightly but he is still having issues.  Patient also reports he is having some balance issues as well at this time.  PT will focus on the L shoulder and neck today.  NV balance will be assessed.  Pain   Neck/L Shoulder  Currently 4/10  At Best 4/10  At Worst 8-9/10  Patient described his pain as a dull ache localized to the left shoulder.    AGGS: OH activities, random events like standing   EASES: chiropractor  Patient's Goal: \"I want to be able to work without any issues and get my old life back.\"          Objective           Precautions:       Manuals                                                                 Neuro Re-Ed                                                                                                        Ther Ex                                                                                                                     Ther Activity                                       Gait Training                                       Modalities                                            "

## 2024-10-01 ENCOUNTER — OFFICE VISIT (OUTPATIENT)
Age: 64
End: 2024-10-01
Payer: COMMERCIAL

## 2024-10-01 DIAGNOSIS — M25.512 CHRONIC LEFT SHOULDER PAIN: ICD-10-CM

## 2024-10-01 DIAGNOSIS — M54.12 CERVICAL RADICULOPATHY: Primary | ICD-10-CM

## 2024-10-01 DIAGNOSIS — G89.29 CHRONIC LEFT SHOULDER PAIN: ICD-10-CM

## 2024-10-01 PROCEDURE — 97140 MANUAL THERAPY 1/> REGIONS: CPT | Performed by: PHYSICAL THERAPIST

## 2024-10-03 ENCOUNTER — OFFICE VISIT (OUTPATIENT)
Age: 64
End: 2024-10-03
Payer: COMMERCIAL

## 2024-10-03 DIAGNOSIS — M25.512 CHRONIC LEFT SHOULDER PAIN: ICD-10-CM

## 2024-10-03 DIAGNOSIS — M54.12 CERVICAL RADICULOPATHY: Primary | ICD-10-CM

## 2024-10-03 DIAGNOSIS — G89.29 CHRONIC LEFT SHOULDER PAIN: ICD-10-CM

## 2024-10-03 PROCEDURE — 97110 THERAPEUTIC EXERCISES: CPT | Performed by: PHYSICAL THERAPIST

## 2024-10-03 PROCEDURE — 97112 NEUROMUSCULAR REEDUCATION: CPT | Performed by: PHYSICAL THERAPIST

## 2024-10-03 PROCEDURE — 97140 MANUAL THERAPY 1/> REGIONS: CPT | Performed by: PHYSICAL THERAPIST

## 2024-10-03 NOTE — PROGRESS NOTES
"Daily Note   Today's date: 10/3/2024  Patient name: Bigg Farley  : 1960  MRN: [id number]  Referring provider: sOcar Collier MD  Dx:   Encounter Diagnosis     ICD-10-CM    1. Cervical radiculopathy  M54.12       2. Chronic left shoulder pain  M25.512     G89.29         Start Time: 0815  Stop Time: 0915  Total time in clinic (min): 60 minutes    Subjective: Patient reports his pain increases when he tries to use his L shoulder.  He said the pain shoots from his shoulder into the left side of his neck.      Objective: See treatment diary below    Assessment: Tolerated treatment well.  PT added several exercises because there were no time constraints today.  Patient did very well but required verbal cues for proper technique.  Patient demonstrated fatigue post treatment, exhibited good technique with therapeutic exercises, and would benefit from continued PT    Plan: Continue per plan of care.     Precautions:  PMH of GERD and DJD     Daily Treatment Log  Manuals 9/20 9/27 10/1 10/3         SOR  [facility]         GISTM  [facility]         CSG  [facility]         T/S Mobs  [facility]         Scap Mobs  [facility]         Pec Minor Release  [facility]         Subscap Release  [facility]         Neck Stretching  [facility]         Sh PROM  [facility]         Neuro Re-Ed 9/20 9/27 10/1 10/3         CTJ Butterflies  15x5\" 15x5\" 15x5\"         Cervical Iso    C/S Ext   3 Way  10x5\" ea         LT Framing    [place]  #19   2x10         3 Finger C/S Rot    15x5\" ea                                                Ther Ex 9/20 9/27 10/1 10/3         T/S Ext  15x5\" 15x5\"  15x5\"         Rows     Black  3x10x5\"         Shrugs    #15  2x10x5\"         TB ER     Black  3x10                                                                          Ther Activity                                       Gait Training                                       Modalities 9/20 9/27 10/1 10/3         EPAT    [facility]   "

## 2024-10-06 NOTE — PROGRESS NOTES
"PT Evaluation     Today's date: 10/5/2024  Patient name: Bigg Farley  : 1960  MRN: [id number]  Referring provider: Oscar Collier MD  Dx:   Encounter Diagnosis     ICD-10-CM    1. Cervical radiculopathy  M54.12       2. Chronic left shoulder pain  M25.512     G89.29                      Assessment/Plan    Subjective Evaluation    History of Present Illness  Mechanism of injury: Patient reports he fell on  Day.  He said he hit his head and fell on his L shoulder.   He slipped on black ice outside of [employer] (possible lawsuit).  He said he has radiating pain down both arms but mostly the LUE.  Patient said that he went to a chiropractor for several months.  He said that he has improved slightly but he is still having issues.  Patient also reports he is having some balance issues as well at this time.  PT will focus on the L shoulder and neck today.  NV balance will be assessed.  Pain   Neck/L Shoulder  Currently 4/10  At Best 4/10  At Worst 8-9/10  Patient described his pain as a dull ache localized to the left shoulder.    AGGS: OH activities, random events like standing   EASES: chiropractor  Patient's Goal: \"I want to be able to work without any issues and get my old life back.\"          Objective           Precautions:       Manuals                                                                 Neuro Re-Ed                                                                                                        Ther Ex                                                                                                                     Ther Activity                                       Gait Training                                       Modalities                                            "

## 2024-10-08 ENCOUNTER — APPOINTMENT (OUTPATIENT)
Age: 64
End: 2024-10-08
Payer: COMMERCIAL

## 2024-10-10 ENCOUNTER — APPOINTMENT (OUTPATIENT)
Age: 64
End: 2024-10-10
Payer: COMMERCIAL

## 2024-10-11 ENCOUNTER — APPOINTMENT (OUTPATIENT)
Age: 64
End: 2024-10-11
Payer: COMMERCIAL